# Patient Record
Sex: MALE | Race: WHITE | NOT HISPANIC OR LATINO | Employment: FULL TIME | URBAN - METROPOLITAN AREA
[De-identification: names, ages, dates, MRNs, and addresses within clinical notes are randomized per-mention and may not be internally consistent; named-entity substitution may affect disease eponyms.]

---

## 2017-02-15 ENCOUNTER — GENERIC CONVERSION - ENCOUNTER (OUTPATIENT)
Dept: OTHER | Facility: OTHER | Age: 37
End: 2017-02-15

## 2017-06-19 ENCOUNTER — HOSPITAL ENCOUNTER (OUTPATIENT)
Dept: RADIOLOGY | Age: 37
Discharge: HOME | End: 2017-06-19
Payer: COMMERCIAL

## 2017-06-19 DIAGNOSIS — M54.2 CERVICALGIA: ICD-10-CM

## 2017-06-19 DIAGNOSIS — R68.89: ICD-10-CM

## 2017-06-19 PROCEDURE — A9585 GADOBUTROL INJECTION: HCPCS | Performed by: RADIOLOGY

## 2017-06-19 PROCEDURE — 72156 MRI NECK SPINE W/O & W/DYE: CPT

## 2017-06-19 RX ADMIN — GADOBUTROL 8 ML: 604.72 INJECTION INTRAVENOUS at 16:57

## 2017-06-27 ENCOUNTER — GENERIC CONVERSION - ENCOUNTER (OUTPATIENT)
Dept: OTHER | Facility: OTHER | Age: 37
End: 2017-06-27

## 2017-07-06 ENCOUNTER — ALLSCRIPTS OFFICE VISIT (OUTPATIENT)
Dept: OTHER | Facility: OTHER | Age: 37
End: 2017-07-06

## 2017-07-06 DIAGNOSIS — R05.9 COUGH: ICD-10-CM

## 2017-07-06 DIAGNOSIS — E78.00 PURE HYPERCHOLESTEROLEMIA: ICD-10-CM

## 2018-01-15 VITALS
SYSTOLIC BLOOD PRESSURE: 118 MMHG | OXYGEN SATURATION: 98 % | HEIGHT: 69 IN | RESPIRATION RATE: 18 BRPM | DIASTOLIC BLOOD PRESSURE: 80 MMHG | BODY MASS INDEX: 28 KG/M2 | HEART RATE: 60 BPM | WEIGHT: 189.04 LBS

## 2018-01-15 NOTE — MISCELLANEOUS
Provider Comments  Provider Comments:   Pt was a n/s  LMOM to call back and make new apt        Signatures   Electronically signed by : SEVEN Ford ; Feb 15 2017 10:56AM EST                       (Review)

## 2018-06-19 ENCOUNTER — OFFICE VISIT (OUTPATIENT)
Dept: INTERNAL MEDICINE CLINIC | Facility: CLINIC | Age: 38
End: 2018-06-19
Payer: COMMERCIAL

## 2018-06-19 VITALS
HEART RATE: 60 BPM | OXYGEN SATURATION: 98 % | DIASTOLIC BLOOD PRESSURE: 90 MMHG | SYSTOLIC BLOOD PRESSURE: 110 MMHG | HEIGHT: 69 IN | WEIGHT: 191.2 LBS | BODY MASS INDEX: 28.32 KG/M2 | TEMPERATURE: 98.1 F

## 2018-06-19 DIAGNOSIS — R10.32 ABDOMINAL PAIN, LEFT LOWER QUADRANT: Primary | ICD-10-CM

## 2018-06-19 DIAGNOSIS — R10.9 ACUTE LEFT FLANK PAIN: ICD-10-CM

## 2018-06-19 PROCEDURE — 99214 OFFICE O/P EST MOD 30 MIN: CPT | Performed by: INTERNAL MEDICINE

## 2018-06-19 RX ORDER — ALPRAZOLAM 0.25 MG/1
1 TABLET ORAL 3 TIMES DAILY PRN
COMMUNITY
Start: 2016-08-11 | End: 2018-06-19 | Stop reason: ALTCHOICE

## 2018-06-19 NOTE — ASSESSMENT & PLAN NOTE
With mild tenderness discussed the possibility of early diverticulitis, discussed getting CT of the abdomen pelvis for further evaluation of this

## 2018-06-19 NOTE — ASSESSMENT & PLAN NOTE
With pain radiating around in the left lower quadrant, discussed possibility of kidney stone, will check urinalysis and imaging  Stay hydrated      If all normal, this could just be a muscle strain from playing baseball, and physical therapy recommended for this

## 2018-06-19 NOTE — PATIENT INSTRUCTIONS
Problem List Items Addressed This Visit     Abdominal pain, left lower quadrant - Primary       With mild tenderness discussed the possibility of early diverticulitis, discussed getting CT of the abdomen pelvis for further evaluation of this  Relevant Orders    CT abdomen pelvis w contrast    CBC and differential    Comprehensive metabolic panel    Urinalysis with reflex to microscopic    Acute left flank pain       With pain radiating around in the left lower quadrant, discussed possibility of kidney stone, will check urinalysis and imaging  Stay hydrated      If all normal, this could just be a muscle strain from playing baseball, and physical therapy recommended for this         Relevant Orders    CT abdomen pelvis w contrast    CBC and differential    Comprehensive metabolic panel    Urinalysis with reflex to microscopic

## 2018-06-22 ENCOUNTER — HOSPITAL ENCOUNTER (OUTPATIENT)
Dept: RADIOLOGY | Age: 38
Discharge: HOME | End: 2018-06-22
Payer: COMMERCIAL

## 2018-06-22 DIAGNOSIS — R10.9 ACUTE LEFT FLANK PAIN: ICD-10-CM

## 2018-06-22 DIAGNOSIS — R10.32 ABDOMINAL PAIN, LEFT LOWER QUADRANT: ICD-10-CM

## 2018-06-22 PROCEDURE — 74178 CT ABD&PLV WO CNTR FLWD CNTR: CPT

## 2018-06-22 RX ADMIN — IOHEXOL 100 ML: 350 INJECTION, SOLUTION INTRAVENOUS at 09:23

## 2018-06-26 ENCOUNTER — TELEPHONE (OUTPATIENT)
Dept: INTERNAL MEDICINE CLINIC | Facility: CLINIC | Age: 38
End: 2018-06-26

## 2018-06-26 NOTE — TELEPHONE ENCOUNTER
Patient called back, asked if this result ruled out diverticulitis? I asked Kristi she said there was not mention of diverticulitis  Patient asked if you would review the results as well when you return because he is still feeling symptoms/pain  Patient said he will go to urgent care if they worsen but wanted your opinion

## 2018-07-02 DIAGNOSIS — M54.50 ACUTE LEFT-SIDED LOW BACK PAIN WITHOUT SCIATICA: Primary | ICD-10-CM

## 2018-07-02 NOTE — TELEPHONE ENCOUNTER
I called pt and referred to ortho  Patient complained of ongoing pain in left lower back, with numbness in his left side in left lower abdomen

## 2018-07-20 ENCOUNTER — APPOINTMENT (OUTPATIENT)
Dept: RADIOLOGY | Facility: OTHER | Age: 38
End: 2018-07-20
Payer: COMMERCIAL

## 2018-07-20 ENCOUNTER — OFFICE VISIT (OUTPATIENT)
Dept: OBGYN CLINIC | Facility: OTHER | Age: 38
End: 2018-07-20
Payer: COMMERCIAL

## 2018-07-20 VITALS
BODY MASS INDEX: 28.14 KG/M2 | SYSTOLIC BLOOD PRESSURE: 148 MMHG | HEIGHT: 69 IN | HEART RATE: 60 BPM | WEIGHT: 190 LBS | DIASTOLIC BLOOD PRESSURE: 90 MMHG

## 2018-07-20 DIAGNOSIS — M54.42 ACUTE BILATERAL LOW BACK PAIN WITH LEFT-SIDED SCIATICA: Primary | ICD-10-CM

## 2018-07-20 DIAGNOSIS — M54.42 ACUTE BILATERAL LOW BACK PAIN WITH LEFT-SIDED SCIATICA: ICD-10-CM

## 2018-07-20 DIAGNOSIS — R29.898 WEAKNESS OF FOOT, LEFT: ICD-10-CM

## 2018-07-20 PROCEDURE — 72114 X-RAY EXAM L-S SPINE BENDING: CPT

## 2018-07-20 PROCEDURE — 99204 OFFICE O/P NEW MOD 45 MIN: CPT | Performed by: INTERNAL MEDICINE

## 2018-07-20 RX ORDER — NAPROXEN 500 MG/1
500 TABLET ORAL 2 TIMES DAILY WITH MEALS
Qty: 30 TABLET | Refills: 0 | Status: SHIPPED | OUTPATIENT
Start: 2018-07-20 | End: 2019-01-02 | Stop reason: ALTCHOICE

## 2018-07-20 RX ORDER — PREDNISONE 20 MG/1
20 TABLET ORAL DAILY
Qty: 6 TABLET | Refills: 0 | Status: SHIPPED | OUTPATIENT
Start: 2018-07-20 | End: 2019-01-02 | Stop reason: ALTCHOICE

## 2018-07-20 NOTE — PROGRESS NOTES
Assessment/Plan:  Assessment/Plan   Diagnoses and all orders for this visit:    Acute bilateral low back pain with left-sided sciatica  -     Ambulatory referral to Orthopedic Surgery  -     XR spine lumbar complete w bending minimum 6 views; Future  -     naproxen (NAPROSYN) 500 mg tablet; Take 1 tablet (500 mg total) by mouth 2 (two) times a day with meals  -     predniSONE 20 mg tablet; Take 1 tablet (20 mg total) by mouth daily x5 days then 1/2 tab on day 6 & 7  -     MRI lumbar spine wo contrast; Future    Weakness of foot, left  -     MRI lumbar spine wo contrast; Future    Other orders  -     ibuprofen (MOTRIN) 100 mg/5 mL suspension; Take 200 mg by mouth every 4 (four) hours as needed for mild pain    Lumbosacral x-ray done today is significant for grade 1 anterior spondylolisthesis at L4/L5  Given the presence of his low back pain with neurological symptoms and muscle weakness, I have ordered lumbar MRI for further diagnostic evaluation  He will follow up for re-evaluation once the MRI is completed  Subjective:   Patient ID: Rudy Case is a 30 y o  male  HPI    Mr Lola Samuel is a pleasant 29-year-old active gentleman who presents for initial evaluation of an acute onset low back pain which he developed while playing baseball approximately 1/2 months ago  His pain started insidiously during 1 of his games  He does not remember any specific time point or incident that caused onset of the pain  The pain has gradually worsened   To the point that he has actually stopped plain due to his pain symptoms  Initially, he was taking Aleve 1 tab p o  P r n  For pain control  He  Has also purchased an inversion table which he has been using for symptomatic relief  He reports that his pain although was across his back, it travels more towards his left side  The pain occasionally radiates to the anterior aspect of his  Left lower abdomen, pelvis/ groin area     The numbness radiation into his anterior left abdomen is constant  He also experiences intermittent numbness and tingling down his left leg into his entire toes  His pain is exacerbated by   Standing,prolonged sitting, sitting with his legs crossed, and ambulation  His pain is alleviated somewhat when he is sleeping on his right side  He denies any focal weakness, bowel or bladder incontinence  The following portions of the patient's history were reviewed and updated as appropriate: allergies, current medications, past family history, past medical history, past social history, past surgical history and problem list     Review of Systems  Review of Systems   Constitutional: Negative  HENT: Negative  Eyes: Negative  Respiratory: Negative  Cardiovascular: Negative  Musculoskeletal:        As per history of present illness   Skin: Negative  Neurological:        As per history of present illness   Psychiatric/Behavioral: Negative  Objective:  Vitals:    07/20/18 1326   BP: 148/90   Pulse: 60   Weight: 86 2 kg (190 lb)   Height: 5' 9" (1 753 m)       Back Exam     Tenderness   The patient is experiencing tenderness in the lumbar (L4 through S1 palpable bilateral paralumbar discomfort without overt pain  )  Range of Motion   Back extension: Pain with lumbar extension  Flexion: normal   Back lateral bend left: Painful  Muscle Strength   Back normal muscle strength: 4/5 left hip flexion weakness  Left Great toe extension (EHL) weakness  Tests   Straight leg raise left: positive    Reflexes   Patellar: normal    Other   Erythema: no back redness    Comments:  Positive Stork test             Physical Exam  Constitutional: Oriented to person, place, and time  Well-developed and well-nourished  HENT:   Head: Normocephalic and atraumatic  Eyes: Conjunctivae are normal    Cardiovascular: Normal rate  Pulmonary/Chest: Effort normal    Neurological: Alert and oriented to person, place, and time     Skin: Skin is warm and dry    Psychiatric: Normal mood and affect  I have personally reviewed pertinent films in PACS and my interpretation is Lumbar x-ray done today significant for a grade 1 L4/L5 anterior spondylolisthesis  Patient has a 6 non-rib-bearing vetebrae due to S1 lumbarization  Vernell Concepcion

## 2018-07-25 ENCOUNTER — HOSPITAL ENCOUNTER (OUTPATIENT)
Dept: RADIOLOGY | Age: 38
Discharge: HOME | End: 2018-07-25
Payer: COMMERCIAL

## 2018-07-25 DIAGNOSIS — R29.898 WEAKNESS OF FOOT, LEFT: ICD-10-CM

## 2018-07-25 DIAGNOSIS — M54.42 ACUTE BILATERAL LOW BACK PAIN WITH LEFT-SIDED SCIATICA: ICD-10-CM

## 2018-07-25 PROCEDURE — 72148 MRI LUMBAR SPINE W/O DYE: CPT

## 2018-07-26 ENCOUNTER — TELEPHONE (OUTPATIENT)
Dept: OBGYN CLINIC | Facility: HOSPITAL | Age: 38
End: 2018-07-26

## 2018-07-26 DIAGNOSIS — M43.10 PARS DEFECT WITH SPONDYLOLISTHESIS: Primary | ICD-10-CM

## 2018-07-26 DIAGNOSIS — M48.061 FORAMINAL STENOSIS OF LUMBAR REGION: ICD-10-CM

## 2018-07-26 DIAGNOSIS — M43.00 PARS DEFECT WITH SPONDYLOLISTHESIS: Primary | ICD-10-CM

## 2018-07-26 NOTE — TELEPHONE ENCOUNTER
Caller: patient  Call back number: [phone]  Patient's doctor: Dr Rin Moreno    Patient called asking where he needs to contact to get the back brace fitted   Please advise

## 2018-07-30 ENCOUNTER — OFFICE VISIT (OUTPATIENT)
Dept: OBGYN CLINIC | Facility: HOSPITAL | Age: 38
End: 2018-07-30
Payer: COMMERCIAL

## 2018-07-30 VITALS
HEIGHT: 69 IN | WEIGHT: 192.6 LBS | DIASTOLIC BLOOD PRESSURE: 86 MMHG | BODY MASS INDEX: 28.53 KG/M2 | SYSTOLIC BLOOD PRESSURE: 126 MMHG | HEART RATE: 60 BPM

## 2018-07-30 DIAGNOSIS — M43.00 PARS DEFECT WITH SPONDYLOLISTHESIS: ICD-10-CM

## 2018-07-30 DIAGNOSIS — M43.10 PARS DEFECT WITH SPONDYLOLISTHESIS: ICD-10-CM

## 2018-07-30 DIAGNOSIS — M48.061 FORAMINAL STENOSIS OF LUMBAR REGION: ICD-10-CM

## 2018-07-30 PROBLEM — M51.36 DDD (DEGENERATIVE DISC DISEASE), LUMBAR: Status: ACTIVE | Noted: 2018-07-30

## 2018-07-30 PROBLEM — M51.369 DDD (DEGENERATIVE DISC DISEASE), LUMBAR: Status: ACTIVE | Noted: 2018-07-30

## 2018-07-30 PROCEDURE — 99214 OFFICE O/P EST MOD 30 MIN: CPT | Performed by: ORTHOPAEDIC SURGERY

## 2018-07-30 NOTE — PROGRESS NOTES
Assessment/Plan:      Patient seen and examined by Dr Erin Harris and myself  He has chronic transverse LBP recently aggravated by sporting activities, and now presents with intermittent LE radicular symptoms  MRI lumbar spine reveals grade 2 spondylolisthesis at L4 on L5 causing moderate and severe spinal stenosis  Our recommendation at this time is to initiate physical therapy for core strengthening  If symptoms worsen, he can try injections in the future, however hold off for now given symptoms are mild to moderate  No surgical intervention at this time from our standpoint  He can return to us on a PRN basis, if symptoms worsen without relief from conservative management  Problem List Items Addressed This Visit     Pars defect with spondylolisthesis    Foraminal stenosis of lumbar region            Subjective:      Patient ID: Krista Anderson is a 41 y o  male  HPI     The patient is a 42-year-old male who presents for initial evaluation with Dr Erin Harris  He states that he has had several years of chronic aching low back pain however has recently been intensified by golf and baseball  He states the pain is transverse lower back dull aching and has recently got an sharp shooting pains to bilateral lower extremities left greater than right  He also has occasional left foot numbness however this is rare  He denies any bowel or bladder incontinence no specific injury no history of surgery on his lower back  He has not done physical therapy or injections for this problem  He is referred by Sports Medicine team due to his MRI of the lumbar spine  The following portions of the patient's history were reviewed and updated as appropriate: allergies, current medications, past family history, past medical history, past social history, past surgical history and problem list     Review of Systems   Constitutional: Negative for chills, diaphoresis, fatigue and fever     Respiratory: Negative for shortness of breath and wheezing  Cardiovascular: Negative for chest pain, palpitations and leg swelling  Genitourinary: Negative for decreased urine volume and difficulty urinating  Musculoskeletal: Positive for arthralgias and back pain  Negative for gait problem  Skin: Negative for color change, pallor, rash and wound  Neurological: Positive for numbness  Negative for weakness  Objective:      /86   Pulse 60   Ht 5' 9" (1 753 m)   Wt 87 4 kg (192 lb 9 6 oz)   BMI 28 44 kg/m²          Physical Exam   Constitutional: He is oriented to person, place, and time  He appears well-developed and well-nourished  No distress  HENT:   Head: Normocephalic and atraumatic  Pulmonary/Chest: Effort normal    Abdominal: Soft  Musculoskeletal: He exhibits tenderness  He exhibits no edema  Neurological: He is alert and oriented to person, place, and time  Skin: Skin is warm and dry  He is not diaphoretic  Psychiatric: He has a normal mood and affect  Nursing note and vitals reviewed  No acute distress  Gait is normal   Inspection reveals no open wounds or erythema  Lumbosacral region is mildly tender to palpation  Negative modified straight leg raise bilaterally negative ABE  Strength is 4/5 with left great toe dorsiflexion otherwise 5/5 L2 through S1 bilaterally  Sensation is equal and intact reflexes are normal at L4 and S1 symmetrically  There is no calf tenderness or pitting edema  Palpable posterior tibialis pulses bilaterally

## 2019-01-02 ENCOUNTER — OFFICE VISIT (OUTPATIENT)
Dept: INTERNAL MEDICINE CLINIC | Facility: CLINIC | Age: 39
End: 2019-01-02
Payer: COMMERCIAL

## 2019-01-02 VITALS
SYSTOLIC BLOOD PRESSURE: 120 MMHG | OXYGEN SATURATION: 99 % | DIASTOLIC BLOOD PRESSURE: 88 MMHG | BODY MASS INDEX: 29.18 KG/M2 | WEIGHT: 197 LBS | HEART RATE: 66 BPM | TEMPERATURE: 98.4 F | HEIGHT: 69 IN

## 2019-01-02 DIAGNOSIS — Z13.220 SCREENING FOR HYPERCHOLESTEROLEMIA: ICD-10-CM

## 2019-01-02 DIAGNOSIS — Z13.29 SCREENING FOR THYROID DISORDER: ICD-10-CM

## 2019-01-02 DIAGNOSIS — Z00.00 ENCOUNTER FOR WELLNESS EXAMINATION: Primary | ICD-10-CM

## 2019-01-02 DIAGNOSIS — F41.9 ANXIETY: ICD-10-CM

## 2019-01-02 PROCEDURE — 99395 PREV VISIT EST AGE 18-39: CPT | Performed by: INTERNAL MEDICINE

## 2019-01-02 RX ORDER — ESCITALOPRAM OXALATE 10 MG/1
10 TABLET ORAL DAILY
Qty: 30 TABLET | Refills: 5 | Status: SHIPPED | OUTPATIENT
Start: 2019-01-02 | End: 2019-07-14 | Stop reason: SDUPTHER

## 2019-01-02 NOTE — ASSESSMENT & PLAN NOTE
Will try escitalopram, and discussed with patient that after approximately 6 months panel how he is feeling he could try titrating off, or continuing

## 2019-01-02 NOTE — ASSESSMENT & PLAN NOTE
Discussed preventative health, cancer screening, immunizations, and safety issues    Blood pressure was slightly elevated on arrival, but then came down to normal

## 2019-01-02 NOTE — PROGRESS NOTES
Assessment/Plan:    Anxiety  Will try escitalopram, and discussed with patient that after approximately 6 months panel how he is feeling he could try titrating off, or continuing  Encounter for wellness examination  Discussed preventative health, cancer screening, immunizations, and safety issues  Blood pressure was slightly elevated on arrival, but then came down to normal        Diagnoses and all orders for this visit:    Encounter for wellness examination    Anxiety  -     escitalopram (LEXAPRO) 10 mg tablet; Take 1 tablet (10 mg total) by mouth daily    Screening for thyroid disorder  -     TSH, 3rd generation with Free T4 reflex; Future    Screening for hypercholesterolemia  -     Lipid Panel with Direct LDL reflex; Future    Other orders  -     Cancel: TDAP VACCINE GREATER THAN OR EQUAL TO 8YO IM          Subjective:      Patient ID: Issac Tyler is a 41 y o  male  Wellness:  Patient sees a dentist regularly, no smoking cigarettes, he has been eating is healthfully or exercising as much due to having two  twins at home  Patient having some anxiety lately, with feelings of being overwhelmed  He does have a history of this was treated with escitalopram in the past, about 2 years ago  The following portions of the patient's history were reviewed and updated as appropriate: allergies, current medications, past family history, past medical history, past social history, past surgical history and problem list     Review of Systems   Constitutional: Negative for chills, fatigue and fever  HENT: Negative for congestion, nosebleeds, postnasal drip, sore throat and trouble swallowing  Eyes: Negative for pain  Respiratory: Negative for cough, chest tightness, shortness of breath and wheezing  Cardiovascular: Negative for chest pain, palpitations and leg swelling  Gastrointestinal: Negative for abdominal pain, constipation, diarrhea, nausea and vomiting     Endocrine: Negative for polydipsia and polyuria  Genitourinary: Negative for dysuria, flank pain and hematuria  Musculoskeletal: Negative for arthralgias  Skin: Negative for rash  Neurological: Negative for dizziness, tremors and headaches  Hematological: Does not bruise/bleed easily  Psychiatric/Behavioral: Negative for confusion and dysphoric mood  The patient is nervous/anxious  Objective:      /88   Pulse 66   Temp 98 4 °F (36 9 °C)   Ht 5' 9" (1 753 m)   Wt 89 4 kg (197 lb)   SpO2 99%   BMI 29 09 kg/m²          Physical Exam   Constitutional: He is oriented to person, place, and time  He appears well-developed and well-nourished  No distress  HENT:   Head: Normocephalic and atraumatic  Right Ear: External ear normal    Left Ear: External ear normal    Eyes: Conjunctivae are normal  No scleral icterus  Neck: Normal range of motion  Neck supple  No tracheal deviation present  No thyromegaly present  Cardiovascular: Normal rate, regular rhythm and normal heart sounds  Pulmonary/Chest: Effort normal and breath sounds normal  No respiratory distress  He has no wheezes  He has no rales  Abdominal: Soft  Bowel sounds are normal  There is no tenderness  There is no rebound and no guarding  Hernia confirmed negative in the right inguinal area and confirmed negative in the left inguinal area  Genitourinary: Penis normal  Right testis shows no mass and no tenderness  Left testis shows no mass and no tenderness  Musculoskeletal: He exhibits no edema  Lymphadenopathy:     He has no cervical adenopathy  Neurological: He is alert and oriented to person, place, and time  Psychiatric: He has a normal mood and affect  His behavior is normal  Judgment and thought content normal    Vitals reviewed

## 2019-01-02 NOTE — PATIENT INSTRUCTIONS
Problem List Items Addressed This Visit        Other    Anxiety     Will try escitalopram, and discussed with patient that after approximately 6 months panel how he is feeling he could try titrating off, or continuing  Relevant Medications    escitalopram (LEXAPRO) 10 mg tablet    Encounter for wellness examination - Primary     Discussed preventative health, cancer screening, immunizations, and safety issues    Blood pressure was slightly elevated on arrival, but then came down to normal            Other Visit Diagnoses     Screening for thyroid disorder        Relevant Orders    TSH, 3rd generation with Free T4 reflex    Screening for hypercholesterolemia        Relevant Orders    Lipid Panel with Direct LDL reflex

## 2019-07-14 DIAGNOSIS — F41.9 ANXIETY: ICD-10-CM

## 2019-07-15 RX ORDER — ESCITALOPRAM OXALATE 10 MG/1
TABLET ORAL
Qty: 30 TABLET | Refills: 5 | Status: SHIPPED | OUTPATIENT
Start: 2019-07-15 | End: 2020-02-06

## 2020-01-05 ENCOUNTER — TELEPHONE (OUTPATIENT)
Dept: OTHER | Facility: OTHER | Age: 40
End: 2020-01-05

## 2020-02-06 DIAGNOSIS — F41.9 ANXIETY: ICD-10-CM

## 2020-02-06 RX ORDER — ESCITALOPRAM OXALATE 10 MG/1
TABLET ORAL
Qty: 30 TABLET | Refills: 0 | Status: SHIPPED | OUTPATIENT
Start: 2020-02-06 | End: 2020-02-14 | Stop reason: SDUPTHER

## 2020-02-14 ENCOUNTER — OFFICE VISIT (OUTPATIENT)
Dept: INTERNAL MEDICINE CLINIC | Facility: CLINIC | Age: 40
End: 2020-02-14
Payer: COMMERCIAL

## 2020-02-14 VITALS
DIASTOLIC BLOOD PRESSURE: 80 MMHG | HEIGHT: 69 IN | TEMPERATURE: 98.6 F | OXYGEN SATURATION: 98 % | HEART RATE: 64 BPM | WEIGHT: 197.8 LBS | SYSTOLIC BLOOD PRESSURE: 120 MMHG | BODY MASS INDEX: 29.3 KG/M2

## 2020-02-14 DIAGNOSIS — Z00.00 ENCOUNTER FOR WELLNESS EXAMINATION: Primary | ICD-10-CM

## 2020-02-14 DIAGNOSIS — F41.9 ANXIETY: ICD-10-CM

## 2020-02-14 DIAGNOSIS — E78.00 PURE HYPERCHOLESTEROLEMIA: ICD-10-CM

## 2020-02-14 PROCEDURE — 3008F BODY MASS INDEX DOCD: CPT | Performed by: INTERNAL MEDICINE

## 2020-02-14 PROCEDURE — 99395 PREV VISIT EST AGE 18-39: CPT | Performed by: INTERNAL MEDICINE

## 2020-02-14 RX ORDER — ESCITALOPRAM OXALATE 10 MG/1
10 TABLET ORAL DAILY
Qty: 30 TABLET | Refills: 5 | Status: SHIPPED | OUTPATIENT
Start: 2020-02-14 | End: 2020-09-21

## 2020-02-14 NOTE — ASSESSMENT & PLAN NOTE
Discussed preventative health, cancer screening, immunizations, and safety issues  Patient is up-to-date with flu shot    Patient had the tetanus shot a couple of years ago before his twin boys were born

## 2020-02-14 NOTE — PATIENT INSTRUCTIONS
Problem List Items Addressed This Visit        Other    Anxiety     Patient doing well on medication  Discussed with patient that if he is in a good place where he feels he does not need the medication any more, he can titrate off slowly and see how he does off the medication  If patient were to have a significant return of anxiety symptoms, and can go back on Lexapro         Encounter for wellness examination - Primary     Discussed preventative health, cancer screening, immunizations, and safety issues  Patient is up-to-date with flu shot  Patient had the tetanus shot a couple of years ago before his twin boys were born         Pure hypercholesterolemia     Will recheck, discussed healthy diet and exercise    Discussed that a more plant based diet will help improve cholesterol         Relevant Orders    CBC and differential    Comprehensive metabolic panel    Lipid Panel with Direct LDL reflex    TSH, 3rd generation with Free T4 reflex

## 2020-02-14 NOTE — ASSESSMENT & PLAN NOTE
Will recheck, discussed healthy diet and exercise    Discussed that a more plant based diet will help improve cholesterol

## 2020-02-14 NOTE — PROGRESS NOTES
Assessment/Plan:    Encounter for wellness examination  Discussed preventative health, cancer screening, immunizations, and safety issues  Patient is up-to-date with flu shot  Patient had the tetanus shot a couple of years ago before his twin boys were born    Pure hypercholesterolemia  Will recheck, discussed healthy diet and exercise  Discussed that a more plant based diet will help improve cholesterol    Anxiety  Patient doing well on medication  Discussed with patient that if he is in a good place where he feels he does not need the medication any more, he can titrate off slowly and see how he does off the medication  If patient were to have a significant return of anxiety symptoms, and can go back on Lexapro       Diagnoses and all orders for this visit:    Encounter for wellness examination    Pure hypercholesterolemia  -     CBC and differential; Future  -     Comprehensive metabolic panel; Future  -     Lipid Panel with Direct LDL reflex; Future  -     TSH, 3rd generation with Free T4 reflex; Future    Anxiety  -     escitalopram (LEXAPRO) 10 mg tablet; Take 1 tablet (10 mg total) by mouth daily    Other orders  -     Cancel: TDAP VACCINE GREATER THAN OR EQUAL TO 8YO IM        BMI Counseling: Body mass index is 29 21 kg/m²  The BMI is above normal  Nutrition recommendations include encouraging healthy choices of fruits and vegetables and moderation in carbohydrate intake  Exercise recommendations include exercising 3-5 times per week  Subjective:      Patient ID: Victor Manuel Day is a 42 y o  male  Wellness:  Patient sees a dentist regularly no smoking cigarettes, exercising more, eating healthy diet    Anxiety:  Patient has been doing well on Lexapro, he has been on it about a year        The following portions of the patient's history were reviewed and updated as appropriate: allergies, current medications, past family history, past medical history, past social history, past surgical history and problem list     Review of Systems   Constitutional: Negative for chills, fatigue and fever  HENT: Negative for congestion, nosebleeds, postnasal drip, sore throat and trouble swallowing  Eyes: Negative for pain  Respiratory: Negative for cough, chest tightness, shortness of breath and wheezing  Cardiovascular: Negative for chest pain, palpitations and leg swelling  Gastrointestinal: Negative for abdominal pain, constipation, diarrhea, nausea and vomiting  Endocrine: Negative for polydipsia and polyuria  Genitourinary: Negative for dysuria, flank pain and hematuria  Musculoskeletal: Negative for arthralgias  Skin: Negative for rash  Neurological: Negative for dizziness, tremors, light-headedness and headaches  Hematological: Does not bruise/bleed easily  Psychiatric/Behavioral: Negative for confusion and dysphoric mood  The patient is not nervous/anxious  Objective:      /80   Pulse 64   Temp 98 6 °F (37 °C)   Ht 5' 9" (1 753 m)   Wt 89 7 kg (197 lb 12 8 oz)   SpO2 98%   BMI 29 21 kg/m²          Physical Exam   Constitutional: He is oriented to person, place, and time  He appears well-developed and well-nourished  No distress  HENT:   Head: Normocephalic and atraumatic  Right Ear: External ear normal    Left Ear: External ear normal    Eyes: Conjunctivae are normal  No scleral icterus  Neck: Normal range of motion  Neck supple  No tracheal deviation present  No thyromegaly present  Cardiovascular: Normal rate, regular rhythm and normal heart sounds  No murmur heard  Pulmonary/Chest: Effort normal and breath sounds normal  No respiratory distress  He has no wheezes  He has no rales  Abdominal: Soft  Bowel sounds are normal  There is no tenderness  There is no rebound and no guarding  Hernia confirmed negative in the right inguinal area and confirmed negative in the left inguinal area  Genitourinary: Right testis shows no mass and no tenderness  Left testis shows no mass and no tenderness  Musculoskeletal: He exhibits no edema  Lymphadenopathy:     He has no cervical adenopathy  Neurological: He is alert and oriented to person, place, and time  Psychiatric: He has a normal mood and affect  His behavior is normal  Judgment and thought content normal    Vitals reviewed

## 2020-09-19 DIAGNOSIS — F41.9 ANXIETY: ICD-10-CM

## 2020-09-21 RX ORDER — ESCITALOPRAM OXALATE 10 MG/1
TABLET ORAL
Qty: 90 TABLET | Refills: 3 | Status: SHIPPED | OUTPATIENT
Start: 2020-09-21 | End: 2021-10-08

## 2021-01-11 ENCOUNTER — TELEPHONE (OUTPATIENT)
Dept: INTERNAL MEDICINE CLINIC | Facility: CLINIC | Age: 41
End: 2021-01-11

## 2021-01-11 DIAGNOSIS — R51.9 NONINTRACTABLE HEADACHE, UNSPECIFIED CHRONICITY PATTERN, UNSPECIFIED HEADACHE TYPE: Primary | ICD-10-CM

## 2021-01-11 DIAGNOSIS — R51.9 NONINTRACTABLE HEADACHE, UNSPECIFIED CHRONICITY PATTERN, UNSPECIFIED HEADACHE TYPE: ICD-10-CM

## 2021-01-11 PROCEDURE — U0003 INFECTIOUS AGENT DETECTION BY NUCLEIC ACID (DNA OR RNA); SEVERE ACUTE RESPIRATORY SYNDROME CORONAVIRUS 2 (SARS-COV-2) (CORONAVIRUS DISEASE [COVID-19]), AMPLIFIED PROBE TECHNIQUE, MAKING USE OF HIGH THROUGHPUT TECHNOLOGIES AS DESCRIBED BY CMS-2020-01-R: HCPCS | Performed by: INTERNAL MEDICINE

## 2021-01-11 PROCEDURE — U0005 INFEC AGEN DETEC AMPLI PROBE: HCPCS | Performed by: INTERNAL MEDICINE

## 2021-01-11 NOTE — TELEPHONE ENCOUNTER
Patient was contact 10 min ago that he was with a (+) covid person at work  He was in contact with them on Thursday for about 20 mins, they were farther than 6 feet but he was touching brick samples that the (+) person had touched  Patient is having symptoms: ache, sore muscles and HA x 3 days

## 2021-01-11 NOTE — TELEPHONE ENCOUNTER
Call patient, order for COVID testing entered, he can go for it tomorrow if he can not get to a location before they close today  He should quarantine for 10 days from his onset of symptoms, and monitor for any shortness of breath

## 2021-01-12 LAB — SARS-COV-2 RNA SPEC QL NAA+PROBE: NOT DETECTED

## 2021-03-02 ENCOUNTER — OFFICE VISIT (OUTPATIENT)
Dept: INTERNAL MEDICINE CLINIC | Facility: CLINIC | Age: 41
End: 2021-03-02
Payer: COMMERCIAL

## 2021-03-02 VITALS
DIASTOLIC BLOOD PRESSURE: 62 MMHG | HEART RATE: 82 BPM | BODY MASS INDEX: 30.04 KG/M2 | SYSTOLIC BLOOD PRESSURE: 110 MMHG | WEIGHT: 202.8 LBS | TEMPERATURE: 96.5 F | HEIGHT: 69 IN | OXYGEN SATURATION: 98 %

## 2021-03-02 DIAGNOSIS — Z13.0 ENCOUNTER FOR SCREENING FOR DISEASES OF THE BLOOD AND BLOOD-FORMING ORGANS AND CERTAIN DISORDERS INVOLVING THE IMMUNE MECHANISM: Primary | ICD-10-CM

## 2021-03-02 DIAGNOSIS — Z00.00 ENCOUNTER FOR WELLNESS EXAMINATION: ICD-10-CM

## 2021-03-02 DIAGNOSIS — Z13.220 SCREENING FOR HYPERCHOLESTEROLEMIA: ICD-10-CM

## 2021-03-02 DIAGNOSIS — Z23 NEEDS FLU SHOT: ICD-10-CM

## 2021-03-02 DIAGNOSIS — Z23 ENCOUNTER FOR IMMUNIZATION: ICD-10-CM

## 2021-03-02 DIAGNOSIS — Z13.1 SCREENING FOR DIABETES MELLITUS: ICD-10-CM

## 2021-03-02 DIAGNOSIS — E55.9 VITAMIN D DEFICIENCY: ICD-10-CM

## 2021-03-02 DIAGNOSIS — Z13.29 SCREENING FOR THYROID DISORDER: ICD-10-CM

## 2021-03-02 DIAGNOSIS — Z11.4 SCREENING FOR HIV (HUMAN IMMUNODEFICIENCY VIRUS): ICD-10-CM

## 2021-03-02 DIAGNOSIS — F41.9 ANXIETY: ICD-10-CM

## 2021-03-02 PROCEDURE — 1036F TOBACCO NON-USER: CPT | Performed by: INTERNAL MEDICINE

## 2021-03-02 PROCEDURE — 90471 IMMUNIZATION ADMIN: CPT

## 2021-03-02 PROCEDURE — 99395 PREV VISIT EST AGE 18-39: CPT | Performed by: INTERNAL MEDICINE

## 2021-03-02 PROCEDURE — 3725F SCREEN DEPRESSION PERFORMED: CPT | Performed by: INTERNAL MEDICINE

## 2021-03-02 PROCEDURE — 90686 IIV4 VACC NO PRSV 0.5 ML IM: CPT

## 2021-03-02 NOTE — PROGRESS NOTES
Assessment/Plan:    Encounter for wellness examination   Discussed preventative health, cancer screening, immunizations, and safety issues  I recommend flu shot today, patient reports he had a Tdap vaccination within the past 10 years  Discussed high cholesterol in the past, and the algorithm for estimated 10 year risk of cardiovascular disease for which he is too young if it in the algorithm, but I can put his numbers in as if he were 42 to see what his risk would be once he gets his repeat labs  Anxiety   Continue escitalopram       Diagnoses and all orders for this visit:    Encounter for screening for diseases of the blood and blood-forming organs and certain disorders involving the immune mechanism  -     CBC and differential; Future    Screening for HIV (human immunodeficiency virus)    Encounter for immunization    Screening for diabetes mellitus  -     Comprehensive metabolic panel; Future    Screening for hypercholesterolemia  -     Lipid Panel with Direct LDL reflex; Future    Screening for thyroid disorder  -     TSH, 3rd generation with Free T4 reflex; Future    Vitamin D deficiency  -     Vitamin D 25 hydroxy; Future    Needs flu shot  -     influenza vaccine, quadrivalent, 0 5 mL, preservative-free, for adult and pediatric patients 6 mos+ (AFLURIA, FLUARIX, FLULAVAL, FLUZONE)    Encounter for wellness examination    Anxiety    Other orders  -     Cancel: HIV 1/2 Antigen/Antibody (4th Generation) w Reflex [facility]; Future  -     Cancel: TDAP VACCINE GREATER THAN OR EQUAL TO 8YO IM          Subjective:      Patient ID: Montez Street is a 47 y o  male      Wellness:  Patient is healthy diet, exercises, no smoking cigarettes, sees the dentist regularly      The following portions of the patient's history were reviewed and updated as appropriate: allergies, current medications, past family history, past medical history, past social history, past surgical history and problem list     Review of Systems Constitutional: Negative for chills, fatigue and fever  HENT: Negative for congestion, nosebleeds, postnasal drip, sore throat and trouble swallowing  Eyes: Negative for pain  Respiratory: Negative for cough, chest tightness, shortness of breath and wheezing  Cardiovascular: Negative for chest pain, palpitations and leg swelling  Gastrointestinal: Negative for abdominal pain, constipation, diarrhea, nausea and vomiting  Endocrine: Negative for polydipsia and polyuria  Genitourinary: Negative for dysuria, flank pain and hematuria  Musculoskeletal: Negative for arthralgias  Skin: Negative for rash  Neurological: Negative for dizziness, tremors, light-headedness and headaches  Hematological: Does not bruise/bleed easily  Psychiatric/Behavioral: Negative for confusion and dysphoric mood  The patient is not nervous/anxious  Objective:      /62   Pulse 82   Temp (!) 96 5 °F (35 8 °C)   Ht 5' 9" (1 753 m)   Wt 92 kg (202 lb 12 8 oz)   SpO2 98%   BMI 29 95 kg/m²          Physical Exam  Vitals signs reviewed  Constitutional:       General: He is not in acute distress  Appearance: Normal appearance  He is well-developed  HENT:      Head: Normocephalic and atraumatic  Right Ear: External ear normal       Left Ear: External ear normal    Eyes:      General: No scleral icterus  Conjunctiva/sclera: Conjunctivae normal    Neck:      Musculoskeletal: Normal range of motion and neck supple  Thyroid: No thyromegaly  Trachea: No tracheal deviation  Cardiovascular:      Rate and Rhythm: Normal rate and regular rhythm  Heart sounds: Normal heart sounds  No murmur  Pulmonary:      Effort: Pulmonary effort is normal  No respiratory distress  Breath sounds: Normal breath sounds  No wheezing or rales  Abdominal:      General: Bowel sounds are normal       Palpations: Abdomen is soft  Tenderness: There is no abdominal tenderness   There is no guarding or rebound  Hernia: There is no hernia in the left inguinal area or right inguinal area  Genitourinary:     Scrotum/Testes: Normal    Musculoskeletal:      Right lower leg: No edema  Left lower leg: No edema  Lymphadenopathy:      Cervical: No cervical adenopathy  Neurological:      General: No focal deficit present  Mental Status: He is alert and oriented to person, place, and time  Psychiatric:         Mood and Affect: Mood normal          Behavior: Behavior normal          Thought Content:  Thought content normal          Judgment: Judgment normal

## 2021-03-02 NOTE — ASSESSMENT & PLAN NOTE
Discussed preventative health, cancer screening, immunizations, and safety issues  I recommend flu shot today, patient reports he had a Tdap vaccination within the past 10 years  Discussed high cholesterol in the past, and the algorithm for estimated 10 year risk of cardiovascular disease for which he is too young if it in the algorithm, but I can put his numbers in as if he were 42 to see what his risk would be once he gets his repeat labs

## 2021-03-02 NOTE — PATIENT INSTRUCTIONS
Problem List Items Addressed This Visit        Other    Anxiety      Continue escitalopram         Encounter for wellness examination      Discussed preventative health, cancer screening, immunizations, and safety issues  I recommend flu shot today, patient reports he had a Tdap vaccination within the past 10 years  Discussed high cholesterol in the past, and the algorithm for estimated 10 year risk of cardiovascular disease for which he is too young if it in the algorithm, but I can put his numbers in as if he were 42 to see what his risk would be once he gets his repeat labs             Other Visit Diagnoses     Encounter for screening for diseases of the blood and blood-forming organs and certain disorders involving the immune mechanism    -  Primary    Relevant Orders    CBC and differential    Screening for HIV (human immunodeficiency virus)        Encounter for immunization        Screening for diabetes mellitus        Relevant Orders    Comprehensive metabolic panel    Screening for hypercholesterolemia        Relevant Orders    Lipid Panel with Direct LDL reflex    Screening for thyroid disorder        Relevant Orders    TSH, 3rd generation with Free T4 reflex    Vitamin D deficiency        Relevant Orders    Vitamin D 25 hydroxy    Needs flu shot        Relevant Orders    influenza vaccine, quadrivalent, 0 5 mL, preservative-free, for adult and pediatric patients 6 mos+ (AFLURIA, FLUARIX, FLULAVAL, FLUZONE)

## 2021-04-13 ENCOUNTER — TELEMEDICINE (OUTPATIENT)
Dept: INTERNAL MEDICINE CLINIC | Facility: CLINIC | Age: 41
End: 2021-04-13
Payer: COMMERCIAL

## 2021-04-13 DIAGNOSIS — R50.9 FEVER, UNSPECIFIED FEVER CAUSE: Primary | ICD-10-CM

## 2021-04-13 DIAGNOSIS — R50.9 FEVER, UNSPECIFIED FEVER CAUSE: ICD-10-CM

## 2021-04-13 PROCEDURE — U0005 INFEC AGEN DETEC AMPLI PROBE: HCPCS | Performed by: INTERNAL MEDICINE

## 2021-04-13 PROCEDURE — 1036F TOBACCO NON-USER: CPT | Performed by: INTERNAL MEDICINE

## 2021-04-13 PROCEDURE — U0003 INFECTIOUS AGENT DETECTION BY NUCLEIC ACID (DNA OR RNA); SEVERE ACUTE RESPIRATORY SYNDROME CORONAVIRUS 2 (SARS-COV-2) (CORONAVIRUS DISEASE [COVID-19]), AMPLIFIED PROBE TECHNIQUE, MAKING USE OF HIGH THROUGHPUT TECHNOLOGIES AS DESCRIBED BY CMS-2020-01-R: HCPCS | Performed by: INTERNAL MEDICINE

## 2021-04-13 PROCEDURE — 99213 OFFICE O/P EST LOW 20 MIN: CPT | Performed by: INTERNAL MEDICINE

## 2021-04-13 NOTE — PATIENT INSTRUCTIONS
Problem List Items Addressed This Visit        Other    Fever - Primary      With patient's symptoms, I recommend checking for COVID  Patient instructed to quarantine for 10 days from the onset of his symptoms unless testing comes back negative  Patient instructed to reach out if he were to develop any shortness of breath or shortness of breath with exertion    Patient referred to this [facility] web site for other details and frequently ask questions         Relevant Orders    Novel Coronavirus (Covid-19),PCR [facility] - Collected at   [facility]  or [facility]

## 2021-04-13 NOTE — PROGRESS NOTES
COVID-19 Outpatient Progress Note    Assessment/Plan:    Problem List Items Addressed This Visit        Other    Fever - Primary      With patient's symptoms, I recommend checking for COVID  Patient instructed to quarantine for 10 days from the onset of his symptoms unless testing comes back negative  Patient instructed to reach out if he were to develop any shortness of breath or shortness of breath with exertion  Patient referred to this [facility] web site for other details and frequently ask questions         Relevant Orders    Novel Coronavirus (Covid-19),PCR [facility] - Col[facility] at [facility] or [facility]       BMI Counseling: There is no height or weight on file to calculate BMI  The BMI is above normal  Nutrition recommendations include encouraging healthy choices of fruits and vegetables and moderation in carbohydrate intake  Exercise recommendations include exercising 3-5 times per week  Disposition:     I have spent 20 minutes directly with the patient  Encounter provider Silvia Alves MD    Provider located at [address] [place] [id number]    Recent Visits  No visits were found meeting these conditions  Showing recent visits within past 7 days and meeting all other requirements     Today's Visits  Date Type Provider Dept   04/13/21 Telemedicine [facility] today's visits and meeting all other requirements     Future Appointments  No visits were found meeting these conditions  Showing future appointments within next 150 days and meeting all other requirements      This virtual check-in was done via [employer] and patient was informed that this is a secure, HIPAA-compliant platform  He agrees to proceed  Patient agrees to participate in a virtual check in via telephone or video visit instead of presenting to the office to address urgent/immediate medical needs   Patient is aware this is a billable service  After connecting through [facility], the patient was identified by name and date of birth  Nicole Salvador was informed that this was a telemedicine visit and that the exam was being conducted confidentially over secure lines  My office door was closed  No one else was in the room  Nicole Salvador acknowledged consent and understanding of privacy and security of the telemedicine visit  I informed the patient that I have reviewed his record in Epic and presented the opportunity for him to ask any questions regarding the visit today  The patient agreed to participate  Subjective:   Nicole Salvador is a 47 y o  male who is concerned about COVID-19  Patient's symptoms include chills, fatigue, malaise, nasal congestion, cough, nausea, diarrhea, myalgias and headache  Patient denies fever, rhinorrhea, sore throat, anosmia, loss of taste, shortness of breath, chest tightness, abdominal pain and vomiting  Date of symptom onset: 4/10/2021    Lab Results   Component Value Date    SARSCOV2 Not Detected 01/11/2021     Past Medical History:   Diagnosis Date    Basal cell carcinoma (BCC) of scalp or skin of neck     Cancer ([facility] [address] )     DDD (degenerative disc disease), lumbar 7/30/2018     Past Surgical History:   Procedure Laterality Date    NO PAST SURGERIES       Current Outpatient Medications   Medication Sig Dispense Refill    escitalopram (LEXAPRO) 10 mg tablet TAKE 1 TABLET BY MOUTH EVERY DAY 90 tablet 3     No current facility-administered medications for this visit  No Known Allergies    Review of Systems   Constitutional: Positive for chills and fatigue  Negative for fever  HENT: Positive for congestion  Negative for rhinorrhea and sore throat  Respiratory: Positive for cough  Negative for chest tightness and shortness of breath  Gastrointestinal: Positive for diarrhea and nausea  Negative for abdominal pain and vomiting  Musculoskeletal: Positive for myalgias  Neurological: Positive for headaches  Objective: There were no vitals filed for this visit  Physical Exam  Vitals signs reviewed  Constitutional:       General: He is not in acute distress  Appearance: Normal appearance  He is well-developed  He is not diaphoretic  HENT:      Head: Normocephalic and atraumatic  Right Ear: External ear normal       Left Ear: External ear normal       Nose: Nose normal    Eyes:      General: No scleral icterus  Right eye: No discharge  Left eye: No discharge  Conjunctiva/sclera: Conjunctivae normal    Neck:      Musculoskeletal: Normal range of motion and neck supple  Trachea: No tracheal deviation  Pulmonary:      Effort: Pulmonary effort is normal  No respiratory distress  Abdominal:      Palpations: Abdomen is soft  Tenderness: There is no abdominal tenderness  Musculoskeletal: Normal range of motion  Skin:     Coloration: Skin is not pale  Findings: No erythema  Neurological:      Mental Status: He is alert and oriented to person, place, and time  Cranial Nerves: No cranial nerve deficit  Coordination: Coordination normal    Psychiatric:         Behavior: Behavior normal          Thought Content: Thought content normal          Judgment: Judgment normal        VIRTUAL VISIT DISCLAIMER    Paige Schwab acknowledges that he has consented to an online visit or consultation  He understands that the online visit is based solely on information provided by him, and that, in the absence of a face-to-face physical evaluation by the physician, the diagnosis he receives is both limited and provisional in terms of accuracy and completeness  This is not intended to replace a full medical face-to-face evaluation by the physician  Paige Schwab understands and accepts these terms  Yes

## 2021-04-13 NOTE — ASSESSMENT & PLAN NOTE
With patient's symptoms, I recommend checking for COVID  Patient instructed to quarantine for 10 days from the onset of his symptoms unless testing comes back negative  Patient instructed to reach out if he were to develop any shortness of breath or shortness of breath with exertion    Patient referred to this CDC web site for other details and frequently ask questions

## 2021-04-14 LAB — SARS-COV-2 RNA RESP QL NAA+PROBE: POSITIVE

## 2021-10-08 DIAGNOSIS — F41.9 ANXIETY: ICD-10-CM

## 2021-10-08 RX ORDER — ESCITALOPRAM OXALATE 10 MG/1
TABLET ORAL
Qty: 90 TABLET | Refills: 3 | Status: SHIPPED | OUTPATIENT
Start: 2021-10-08

## 2022-02-25 ENCOUNTER — RA CDI HCC (OUTPATIENT)
Dept: OTHER | Facility: HOSPITAL | Age: 42
End: 2022-02-25

## 2022-02-25 NOTE — PROGRESS NOTES
[facility]  coding opportunities       Chart reviewed, no opportunity found: CHART REVIEWED, NO OPPORTUNITY FOUND                        Patients insurance company: [employer] (Medicare Advantage and Commercial)

## 2022-03-03 ENCOUNTER — OFFICE VISIT (OUTPATIENT)
Dept: INTERNAL MEDICINE CLINIC | Facility: CLINIC | Age: 42
End: 2022-03-03
Payer: COMMERCIAL

## 2022-03-03 VITALS
RESPIRATION RATE: 16 BRPM | BODY MASS INDEX: 31.67 KG/M2 | OXYGEN SATURATION: 97 % | SYSTOLIC BLOOD PRESSURE: 124 MMHG | HEIGHT: 69 IN | HEART RATE: 62 BPM | WEIGHT: 213.8 LBS | DIASTOLIC BLOOD PRESSURE: 74 MMHG

## 2022-03-03 DIAGNOSIS — Z11.4 SCREENING FOR HIV (HUMAN IMMUNODEFICIENCY VIRUS): ICD-10-CM

## 2022-03-03 DIAGNOSIS — Z23 ENCOUNTER FOR IMMUNIZATION: ICD-10-CM

## 2022-03-03 DIAGNOSIS — E55.9 VITAMIN D DEFICIENCY: ICD-10-CM

## 2022-03-03 DIAGNOSIS — E78.00 PURE HYPERCHOLESTEROLEMIA: Primary | ICD-10-CM

## 2022-03-03 DIAGNOSIS — Z00.00 ENCOUNTER FOR WELLNESS EXAMINATION: ICD-10-CM

## 2022-03-03 DIAGNOSIS — Z11.59 NEED FOR HEPATITIS C SCREENING TEST: ICD-10-CM

## 2022-03-03 PROCEDURE — 3008F BODY MASS INDEX DOCD: CPT | Performed by: INTERNAL MEDICINE

## 2022-03-03 PROCEDURE — 3725F SCREEN DEPRESSION PERFORMED: CPT | Performed by: INTERNAL MEDICINE

## 2022-03-03 PROCEDURE — 99395 PREV VISIT EST AGE 18-39: CPT | Performed by: INTERNAL MEDICINE

## 2022-03-03 PROCEDURE — 1036F TOBACCO NON-USER: CPT | Performed by: INTERNAL MEDICINE

## 2022-03-03 NOTE — PATIENT INSTRUCTIONS
Problem List Items Addressed This Visit        Other    Encounter for wellness examination       Discussed preventative health, cancer screening, immunizations, and safety issues           Pure hypercholesterolemia - Primary    Relevant Orders    CBC and differential    Comprehensive metabolic panel    Lipid Panel with Direct LDL reflex    TSH, 3rd generation with Free T4 reflex      Other Visit Diagnoses     Need for hepatitis C screening test        Relevant Orders    Hepatitis C Antibody ([facility])    Screening for HIV (human immunodeficiency virus)        Relevant Orders    HIV 1/2 Antigen/Antibody (4th Generation) w Reflex [facility]    Encounter for immunization        Vitamin D deficiency        Relevant Orders    Vitamin D 25 hydroxy

## 2022-03-03 NOTE — PROGRESS NOTES
Assessment/Plan:    Encounter for wellness examination    Discussed preventative health, cancer screening, immunizations, and safety issues  Diagnoses and all orders for this visit:    Pure hypercholesterolemia  -     CBC and differential; Future  -     Comprehensive metabolic panel; Future  -     Lipid Panel with Direct LDL reflex; Future  -     TSH, 3rd generation with Free T4 reflex; Future    Need for hepatitis C screening test  -     Hepatitis C Antibody ([facility]); Future    Screening for HIV (human immunodeficiency virus)  -     HIV 1/2 Antigen/Antibody (4th Generation) w Reflex [facility]; Future    Encounter for immunization    Vitamin D deficiency  -     Vitamin D 25 hydroxy; Future    Encounter for wellness examination        BMI Counseling: Body mass index is 31 57 kg/m²  The BMI is above normal  Nutrition recommendations include encouraging healthy choices of fruits and vegetables and moderation in carbohydrate intake  Exercise recommendations include exercising 3-5 times per week  Rationale for BMI follow-up plan is due to patient being overweight or obese  Depression Screening and Follow-up Plan: Patient was screened for depression during today's encounter  They screened negative with a PHQ-2 score of 0  Subjective:      Patient ID: Rosalie Xiao is a 46 y o  male  Wellness:Sees the dentist regularly, no smoking cigarettes, patient eats healthy diet and exercises  The following portions of the patient's history were reviewed and updated as appropriate: allergies, current medications, past family history, past medical history, past social history, past surgical history and problem list     Review of Systems   Constitutional: Negative for chills, fatigue and fever  HENT: Negative for congestion, nosebleeds, postnasal drip, sore throat and trouble swallowing  Eyes: Negative for pain  Respiratory: Negative for cough, chest tightness, shortness of breath and wheezing  Cardiovascular: Negative for chest pain, palpitations and leg swelling  Gastrointestinal: Negative for abdominal pain, constipation, diarrhea, nausea and vomiting  Endocrine: Negative for polydipsia and polyuria  Genitourinary: Negative for dysuria, flank pain and hematuria  Musculoskeletal: Negative for arthralgias, back pain and myalgias  Skin: Negative for rash  Neurological: Negative for dizziness, tremors, light-headedness and headaches  Hematological: Does not bruise/bleed easily  Psychiatric/Behavioral: Negative for confusion and dysphoric mood  The patient is not nervous/anxious  Objective:      /74   Pulse 62   Resp 16   Ht 5' 9" (1 753 m)   Wt 97 kg (213 lb 12 8 oz)   SpO2 97%   BMI 31 57 kg/m²          Physical Exam  Vitals reviewed  Constitutional:       General: He is not in acute distress  Appearance: Normal appearance  He is well-developed  HENT:      Head: Normocephalic and atraumatic  Right Ear: External ear normal       Left Ear: External ear normal       Nose: Nose normal    Eyes:      General: No scleral icterus  Conjunctiva/sclera: Conjunctivae normal    Neck:      Thyroid: No thyromegaly  Trachea: No tracheal deviation  Cardiovascular:      Rate and Rhythm: Normal rate and regular rhythm  Heart sounds: Normal heart sounds  No murmur heard  Pulmonary:      Effort: Pulmonary effort is normal  No respiratory distress  Breath sounds: Normal breath sounds  No wheezing or rales  Abdominal:      General: Bowel sounds are normal       Palpations: Abdomen is soft  Tenderness: There is no abdominal tenderness  There is no guarding or rebound  Hernia: There is no hernia in the left inguinal area or right inguinal area  Genitourinary:     Testes: Normal    Musculoskeletal:      Cervical back: Normal range of motion and neck supple  Right lower leg: No edema  Left lower leg: No edema     Lymphadenopathy: Cervical: No cervical adenopathy  Skin:     Coloration: Skin is not jaundiced or pale  Neurological:      General: No focal deficit present  Mental Status: He is alert and oriented to person, place, and time  Psychiatric:         Mood and Affect: Mood normal          Behavior: Behavior normal          Thought Content:  Thought content normal          Judgment: Judgment normal

## 2022-05-13 LAB
EXTERNAL HIV SCREEN: NORMAL
HCV AB SER-ACNC: NEGATIVE

## 2022-10-12 PROBLEM — R50.9 FEVER: Status: RESOLVED | Noted: 2021-04-13 | Resolved: 2022-10-12

## 2023-01-24 DIAGNOSIS — F41.9 ANXIETY: ICD-10-CM

## 2023-01-24 RX ORDER — ESCITALOPRAM OXALATE 10 MG/1
TABLET ORAL
Qty: 90 TABLET | Refills: 0 | Status: SHIPPED | OUTPATIENT
Start: 2023-01-24

## 2023-02-27 ENCOUNTER — RA CDI HCC (OUTPATIENT)
Dept: OTHER | Facility: HOSPITAL | Age: 43
End: 2023-02-27

## 2023-02-27 NOTE — PROGRESS NOTES
[facility]       Chart reviewed, no opportunity found: CHART REVIEWED, NO OPPORTUNITY FOUND        Patients Insurance        Commercial Insurance: [address] [place] [address]

## 2023-05-01 ENCOUNTER — HOSPITAL ENCOUNTER (EMERGENCY)
Facility: HOSPITAL | Age: 43
Discharge: HOME | End: 2023-05-01
Attending: EMERGENCY MEDICINE

## 2023-05-01 ENCOUNTER — APPOINTMENT (EMERGENCY)
Dept: RADIOLOGY | Facility: HOSPITAL | Age: 43
End: 2023-05-01

## 2023-05-01 VITALS
OXYGEN SATURATION: 100 % | TEMPERATURE: 98.1 F | DIASTOLIC BLOOD PRESSURE: 55 MMHG | RESPIRATION RATE: 16 BRPM | SYSTOLIC BLOOD PRESSURE: 106 MMHG | BODY MASS INDEX: 31.75 KG/M2 | WEIGHT: 215 LBS | HEART RATE: 68 BPM

## 2023-05-01 DIAGNOSIS — M43.06 PARS DEFECT OF LUMBAR SPINE: ICD-10-CM

## 2023-05-01 DIAGNOSIS — M54.9 BACK PAIN: Primary | ICD-10-CM

## 2023-05-01 DIAGNOSIS — M51.36 DDD (DEGENERATIVE DISC DISEASE), LUMBAR: ICD-10-CM

## 2023-05-01 LAB
ALBUMIN SERPL BCP-MCNC: 4 G/DL (ref 3.5–5)
ALP SERPL-CCNC: 72 U/L (ref 46–116)
ALT SERPL W P-5'-P-CCNC: 53 U/L (ref 12–78)
ANION GAP SERPL CALCULATED.3IONS-SCNC: 2 MMOL/L (ref 4–13)
AST SERPL W P-5'-P-CCNC: 21 U/L (ref 5–45)
BACTERIA UR QL AUTO: NORMAL /HPF
BASOPHILS # BLD AUTO: 0.03 THOUSANDS/ΜL (ref 0–0.1)
BASOPHILS NFR BLD AUTO: 1 % (ref 0–1)
BILIRUB SERPL-MCNC: 0.81 MG/DL (ref 0.2–1)
BILIRUB UR QL STRIP: NEGATIVE
BUN SERPL-MCNC: 10 MG/DL (ref 5–25)
CALCIUM SERPL-MCNC: 8.7 MG/DL (ref 8.3–10.1)
CHLORIDE SERPL-SCNC: 109 MMOL/L (ref 96–108)
CLARITY UR: CLEAR
CO2 SERPL-SCNC: 25 MMOL/L (ref 21–32)
COLOR UR: YELLOW
COLOR, POC: COLORLESS
CREAT SERPL-MCNC: 1.05 MG/DL (ref 0.6–1.3)
EOSINOPHIL # BLD AUTO: 0.06 THOUSAND/ΜL (ref 0–0.61)
EOSINOPHIL NFR BLD AUTO: 1 % (ref 0–6)
ERYTHROCYTE [DISTWIDTH] IN BLOOD BY AUTOMATED COUNT: 11.6 % (ref 11.6–15.1)
GFR SERPL CREATININE-BSD FRML MDRD: 88 ML/MIN/1.73SQ M
GLUCOSE SERPL-MCNC: 102 MG/DL (ref 65–140)
GLUCOSE UR STRIP-MCNC: NEGATIVE MG/DL
HCT VFR BLD AUTO: 42.9 % (ref 36.5–49.3)
HGB BLD-MCNC: 15.8 G/DL (ref 12–17)
HGB UR QL STRIP.AUTO: ABNORMAL
IMM GRANULOCYTES # BLD AUTO: 0.02 THOUSAND/UL (ref 0–0.2)
IMM GRANULOCYTES NFR BLD AUTO: 0 % (ref 0–2)
KETONES UR STRIP-MCNC: NEGATIVE MG/DL
LEUKOCYTE ESTERASE UR QL STRIP: NEGATIVE
LIPASE SERPL-CCNC: 96 U/L (ref 73–393)
LYMPHOCYTES # BLD AUTO: 1.68 THOUSANDS/ΜL (ref 0.6–4.47)
LYMPHOCYTES NFR BLD AUTO: 34 % (ref 14–44)
MCH RBC QN AUTO: 31.8 PG (ref 26.8–34.3)
MCHC RBC AUTO-ENTMCNC: 36.8 G/DL (ref 31.4–37.4)
MCV RBC AUTO: 86 FL (ref 82–98)
MONOCYTES # BLD AUTO: 0.45 THOUSAND/ΜL (ref 0.17–1.22)
MONOCYTES NFR BLD AUTO: 9 % (ref 4–12)
NEUTROPHILS # BLD AUTO: 2.76 THOUSANDS/ΜL (ref 1.85–7.62)
NEUTS SEG NFR BLD AUTO: 55 % (ref 43–75)
NITRITE UR QL STRIP: NEGATIVE
NON-SQ EPI CELLS URNS QL MICRO: NORMAL /HPF
NRBC BLD AUTO-RTO: 0 /100 WBCS
PH UR STRIP.AUTO: 6.5 [PH] (ref 4.5–8)
PLATELET # BLD AUTO: 206 THOUSANDS/UL (ref 149–390)
PMV BLD AUTO: 9.1 FL (ref 8.9–12.7)
POTASSIUM SERPL-SCNC: 3.9 MMOL/L (ref 3.5–5.3)
PROT SERPL-MCNC: 7.1 G/DL (ref 6.4–8.4)
PROT UR STRIP-MCNC: NEGATIVE MG/DL
RBC # BLD AUTO: 4.97 MILLION/UL (ref 3.88–5.62)
RBC #/AREA URNS AUTO: NORMAL /HPF
SODIUM SERPL-SCNC: 136 MMOL/L (ref 135–147)
SP GR UR STRIP.AUTO: 1.02 (ref 1–1.03)
UROBILINOGEN UR QL STRIP.AUTO: 0.2 E.U./DL
WBC # BLD AUTO: 5 THOUSAND/UL (ref 4.31–10.16)
WBC #/AREA URNS AUTO: NORMAL /HPF

## 2023-05-01 RX ORDER — METHOCARBAMOL 750 MG/1
750 TABLET, FILM COATED ORAL 3 TIMES DAILY
Qty: 9 TABLET | Refills: 0 | Status: SHIPPED | OUTPATIENT
Start: 2023-05-01 | End: 2023-05-04

## 2023-05-01 RX ORDER — KETOROLAC TROMETHAMINE 30 MG/ML
15 INJECTION, SOLUTION INTRAMUSCULAR; INTRAVENOUS ONCE
Status: COMPLETED | OUTPATIENT
Start: 2023-05-01 | End: 2023-05-01

## 2023-05-01 RX ORDER — NAPROXEN 500 MG/1
500 TABLET ORAL 2 TIMES DAILY WITH MEALS
Qty: 14 TABLET | Refills: 0 | Status: SHIPPED | OUTPATIENT
Start: 2023-05-01 | End: 2023-05-08

## 2023-05-01 RX ORDER — ONDANSETRON 2 MG/ML
4 INJECTION INTRAMUSCULAR; INTRAVENOUS ONCE
Status: COMPLETED | OUTPATIENT
Start: 2023-05-01 | End: 2023-05-01

## 2023-05-01 RX ADMIN — MORPHINE SULFATE 2 MG: 2 INJECTION, SOLUTION INTRAMUSCULAR; INTRAVENOUS at 12:59

## 2023-05-01 RX ADMIN — ONDANSETRON 4 MG: 2 INJECTION INTRAMUSCULAR; INTRAVENOUS at 10:44

## 2023-05-01 RX ADMIN — SODIUM CHLORIDE 1000 ML: 0.9 INJECTION, SOLUTION INTRAVENOUS at 10:44

## 2023-05-01 RX ADMIN — IOHEXOL 100 ML: 350 INJECTION, SOLUTION INTRAVENOUS at 11:36

## 2023-05-01 RX ADMIN — KETOROLAC TROMETHAMINE 15 MG: 30 INJECTION, SOLUTION INTRAMUSCULAR; INTRAVENOUS at 10:44

## 2023-05-01 NOTE — Clinical Note
Emiliana Sharpe was seen and treated in our emergency department on 5/1/2023  No restrictions            Diagnosis:     Sachin Field  may return to work on return date  He may return on this date: 05/02/2023         If you have any questions or concerns, please don't hesitate to call        Sierra Martinez PA-C    ______________________________           _______________          _______________  Hospital Representative                              Date                                Time

## 2023-05-01 NOTE — ED PROVIDER NOTES
History  Chief Complaint   Patient presents with    Back Pain     Patient reports low back pain for 4 days  Denies urinary symptoms  72-year-old male presents emergency room for evaluation of left mid back pain  Onset about 1 week ago while at rest   Denies lifting or bending injury  Patient is concerned he has a kidney stone however states he has never been diagnosed with that before  Pain feels more deep than muscular  Last night and this morning pain seems to be worse and he has developed nausea  Ibuprofen has not been helping  Denies fever  Denies vomiting  Denies chest pain or shortness of breath  Sometimes the pain radiates history higher in his back and into the left side of his abdomen  Denies back pain radiating down his legs  Denies saddle anesthesia  Denies and has apparently bowel or bladder dysfunction  Denies rash  Denies recreational drug use  Admits getting back pain in the past but states it usually goes away on it own, unlike this episode  History provided by:  Patient  Back Pain  Associated symptoms: no chest pain, no fever, no numbness and no weakness        Prior to Admission Medications   Prescriptions Last Dose Informant Patient Reported? Taking?   escitalopram (LEXAPRO) 10 mg tablet   No No   Sig: TAKE 1 TABLET BY MOUTH EVERY DAY      Facility-Administered Medications: None       Past Medical History:   Diagnosis Date    Basal cell carcinoma (BCC) of scalp or skin of neck     Cancer ([facility] )     DDD (degenerative disc disease), lumbar 7/30/2018       Past Surgical History:   Procedure Laterality Date    NO PAST SURGERIES         Family History   Problem Relation Age of Onset    Coronary artery disease Father     Diabetes Father      I have reviewed and agree with the history as documented      E-Cigarette/Vaping     E-Cigarette/Vaping Substances     Social History     Tobacco Use    Smoking status: Never    Smokeless tobacco: Never   Substance Use Topics    Alcohol use: Yes     Comment: social    Drug use: No       Review of Systems   Constitutional: Negative for chills and fever  Respiratory: Negative for shortness of breath  Cardiovascular: Negative for chest pain  Gastrointestinal: Positive for nausea  Negative for vomiting  Genitourinary: Negative for difficulty urinating  Musculoskeletal: Positive for back pain  Skin: Negative for rash and wound  Neurological: Negative for weakness and numbness  All other systems reviewed and are negative  Physical Exam  Physical Exam  Vitals and nursing note reviewed  Constitutional:       Appearance: He is well-developed  HENT:      Right Ear: External ear normal       Left Ear: External ear normal    Eyes:      Conjunctiva/sclera: Conjunctivae normal    Cardiovascular:      Rate and Rhythm: Normal rate and regular rhythm  Heart sounds: Normal heart sounds  Pulmonary:      Effort: Pulmonary effort is normal       Breath sounds: Normal breath sounds  Abdominal:      General: Bowel sounds are normal  There is no distension  Palpations: Abdomen is soft  Tenderness: There is abdominal tenderness (mild left side)  There is left CVA tenderness (mild)  Musculoskeletal:      Cervical back: Neck supple  Thoracic back: Normal  No signs of trauma, tenderness or bony tenderness  Normal range of motion  Lumbar back: No signs of trauma, tenderness or bony tenderness  Negative right straight leg raise test and negative left straight leg raise test         Back:       Comments: 5/5 Strength BLE flexion and extension  No foot drop  Able to walk     Skin:     General: Skin is warm and dry  Findings: No rash  Neurological:      Mental Status: He is alert and oriented to person, place, and time     Psychiatric:         Mood and Affect: Mood normal          Vital Signs  ED Triage Vitals   Temperature Pulse Respirations Blood Pressure SpO2   05/01/23 1001 05/01/23 0959 05/01/23 0959 05/01/23 0959 05/01/23 0959   98 1 °F (36 7 °C) 65 18 (!) 166/110 96 %      Temp Source Heart Rate Source Patient Position - Orthostatic VS BP Location FiO2 (%)   05/01/23 1001 05/01/23 0959 05/01/23 0959 05/01/23 0959 --   Oral Monitor Sitting Left arm       Pain Score       05/01/23 0959       8           Vitals:    05/01/23 0959 05/01/23 1227   BP: (!) 166/110 106/55   Pulse: 65 68   Patient Position - Orthostatic VS: Sitting Lying         Visual Acuity      ED Medications  Medications   sodium chloride 0 9 % bolus 1,000 mL (0 mL Intravenous Stopped 5/1/23 1144)   ondansetron (ZOFRAN) injection 4 mg (4 mg Intravenous Given 5/1/23 1044)   ketorolac (TORADOL) injection 15 mg (15 mg Intravenous Given 5/1/23 1044)   iohexol (OMNIPAQUE) 350 MG/ML injection (SINGLE-DOSE) 100 mL (100 mL Intravenous Given 5/1/23 1136)   morphine injection 2 mg (2 mg Intravenous Given 5/1/23 1259)       Diagnostic Studies  Results Reviewed     Procedure Component Value Units Date/Time    Urine Microscopic [[id number]]  (Normal) Collected: 05/01/23 1049    Lab Status: Final result Specimen: Urine, Clean Catch Updated: 05/01/23 1139     RBC, UA None Seen /hpf      WBC, UA None Seen /hpf      Epithelial Cells None Seen /hpf      Bacteria, UA None Seen /hpf     Comprehensive metabolic panel [[id number]]  (Abnormal) Collected: 05/01/23 1042    Lab Status: Final result Specimen: Blood from Arm, Left Updated: 05/01/23 1119     Sodium 136 mmol/L      Potassium 3 9 mmol/L      Chloride 109 mmol/L      CO2 25 mmol/L      ANION GAP 2 mmol/L      BUN 10 mg/dL      Creatinine 1 05 mg/dL      Glucose 102 mg/dL      Calcium 8 7 mg/dL      AST 21 U/L      ALT 53 U/L      Alkaline Phosphatase 72 U/L      Total Protein 7 1 g/dL      Albumin 4 0 g/dL      Total Bilirubin 0 81 mg/dL      eGFR 88 ml/min/1 73sq m     Narrative:      [place] guidelines for Chronic Kidney Disease (CKD):     Stage 1 with normal or high GFR (GFR > 90 mL/min/1 73 square meters)    Stage 2 Mild CKD (GFR = 60-89 mL/min/1 73 square meters)    Stage 3A Moderate CKD (GFR = 45-59 mL/min/1 73 square meters)    Stage 3B Moderate CKD (GFR = 30-44 mL/min/1 73 square meters)    Stage 4 Severe CKD (GFR = 15-29 mL/min/1 73 square meters)    Stage 5 End Stage CKD (GFR <15 mL/min/1 73 square meters)  Note: GFR calculation is accurate only with a steady state creatinine    Lipase [[id number]]  (Normal) Collected: 05/01/23 1042    Lab Status: Final result Specimen: Blood from Arm, Left Updated: 05/01/23 1119     Lipase 96 u/L     CBC and differential [[id number]] Collected: 05/01/23 1042    Lab Status: Final result Specimen: Blood from Arm, Left Updated: 05/01/23 1053     WBC 5 00 Thousand/uL      RBC 4 97 Million/uL      Hemoglobin 15 8 g/dL      Hematocrit 42 9 %      MCV 86 fL      MCH 31 8 pg      MCHC 36 8 g/dL      RDW 11 6 %      MPV 9 1 fL      Platelets 176 Thousands/uL      nRBC 0 /100 WBCs      Neutrophils Relative 55 %      Immat GRANS % 0 %      Lymphocytes Relative 34 %      Monocytes Relative 9 %      Eosinophils Relative 1 %      Basophils Relative 1 %      Neutrophils Absolute 2 76 Thousands/µL      Immature Grans Absolute 0 02 Thousand/uL      Lymphocytes Absolute 1 68 Thousands/µL      Monocytes Absolute 0 45 Thousand/µL      Eosinophils Absolute 0 06 Thousand/µL      Basophils Absolute 0 03 Thousands/µL     POCT urinalysis dipstick [[id number]]  (Normal) Resulted: 05/01/23 1052    Lab Status: Final result Specimen: Urine Updated: 05/01/23 1053     Color, UA Colorless     Clarity, UA --     EXT Leukocytes, UA --     Nitrite, UA --     Protein, UA -- mg/dl      Glucose, UA --     Ketones, UA -- mg/dl      EXT Urobilinogen, UA --      Bilirubin, UA --     Blood, UA --    Urine Macroscopic, POC [[id number]]  (Abnormal) Collected: 05/01/23 1049    Lab Status: Final result Specimen: Urine Updated: 05/01/23 1051     Color, UA Yellow     Clarity, UA Clear     pH, UA 6 5     Leukocytes, UA Negative     Nitrite, UA Negative     Protein, UA Negative mg/dl      Glucose, UA Negative mg/dl      Ketones, UA Negative mg/dl      Urobilinogen, UA 0 2 E U /dl      Bilirubin, UA Negative     Occult Blood, UA Trace     Specific Shade, UA 1 020    Narrative:      CLINITEK RESULT                 CT abdomen pelvis with contrast   Final Result by Edilberto Flores MD (05/01 1315)      No evidence of acute abdominopelvic process  Chronic bilateral pars defect at L4 with stable grade 1 anterolisthesis of L4 on L5  Degenerative changes at L4-5 however have progressed since June 2018  Workstation performed: [id number]                    Procedures  Procedures         ED Course  ED Course as of 05/01/23 1336   Mon May 01, 2023   1059 Nausea improved   1330 Pain improved, reviewed CT results with patient, encouraged f/u with spine program                                             Medical Decision Making  Differential diagnosis includes but is not limited to: kidney stone, pyleo, mass, muscular    Amount and/or Complexity of Data Reviewed  Labs: ordered  Details: reviewed  Radiology: ordered  Details: reviewed      Risk  Prescription drug management  Disposition  Final diagnoses:   Back pain   Pars defect of lumbar spine   DDD (degenerative disc disease), lumbar     Time reflects when diagnosis was documented in both MDM as applicable and the Disposition within this note     Time User Action Codes Description Comment    5/1/2023  1:33 PM Ashly CABEZAS Add [M54 9] Back pain     5/1/2023  1:35 PM Narciso Mack Add [M43 06] Pars defect of lumbar spine     5/1/2023  1:36 PM Narciso Mack Add [M51 36] DDD (degenerative disc disease), lumbar       ED Disposition     ED Disposition   Discharge    Condition   Stable    Date/Time   Mon May 1, 2023  1:33 PM    Comment   Brooke Edwards discharge to home/self care                 Follow-up Information Follow up With Specialties Details Why Contact Info Additional Information    [facility] Physical Therapy In 3 days  [phone] [facility] [place] [address] Emergency Department Emergency Medicine  If symptoms worsen Stevie [address] [id number]  [address] Emergency Department, [address], [address] [phone]          Patient's Medications   Discharge Prescriptions    METHOCARBAMOL (ROBAXIN) 750 MG TABLET    Take 1 tablet (750 mg total) by mouth 3 (three) times a day for 3 days       Start Date: 5/1/2023  End Date: 5/4/2023       Order Dose: 750 mg       Quantity: 9 tablet    Refills: 0    NAPROXEN (NAPROSYN) 500 MG TABLET    Take 1 tablet (500 mg total) by mouth 2 (two) times a day with meals for 7 days       Start Date: 5/1/2023  End Date: 5/8/2023       Order Dose: 500 mg       Quantity: 14 tablet    Refills: 0       No discharge procedures on file      PDMP Review     None          ED Provider  Electronically Signed by           Andrea Baum PA-C  05/01/23 0317

## 2023-05-02 ENCOUNTER — NURSE TRIAGE (OUTPATIENT)
Dept: PHYSICAL THERAPY | Facility: OTHER | Age: 43
End: 2023-05-02

## 2023-05-02 DIAGNOSIS — M54.50 ACUTE LEFT-SIDED LOW BACK PAIN WITHOUT SCIATICA: ICD-10-CM

## 2023-05-02 DIAGNOSIS — M54.6 ACUTE LEFT-SIDED THORACIC BACK PAIN: Primary | ICD-10-CM

## 2023-05-02 NOTE — TELEPHONE ENCOUNTER
Additional Information   Negative: Has the patient had unexplained weight loss?  Negative: Does the patient have a fever?  Negative: Is the patient experiencing blood in sputum?  Negative: Is the patient experiencing urine retention?  Negative: Has the patient experienced major trauma? (fall from height, high speed collision, direct blow to spine) and is also experiencing nausea, light-headedness, or loss of consciousness?  Negative: Is the patient experiencing acute drop foot or paralysis?  Negative: Is this a chronic condition? Protocols used: [facility] COMPREH[facility]IVE SPINE PROGRAM PROTOCOL    This RN did review in detail the Comprehensive [facility] and what we can provide for their back pain  Patient is agreeable to being triaged by this RN and would like to proceed with Physical Therapy  Referral was placed for Physical Therapy at the [facility]  Patients information was sent to the  to make evaluation appointment  Patient made aware that the PT office  will be calling to schedule the appointment  Patient was provided with the phone number to the PT office  No further questions and/or concerns were voiced by the patient at this time  Patient states understanding of the referral that was placed  Referral Closed

## 2023-05-02 NOTE — TELEPHONE ENCOUNTER
Additional Information   Negative: Is this related to a work injury?  Negative: Is this related to an MVA?  Negative: Are you currently recieving homecare services? Background - Initial Assessment  Clinical complaint: Pain left mid and low back, no radiation into legs  No numbness or tingling  Started 3 weeks ago as a constant dull pain  Got worse 5 days ago  Was seen in ED 5/1/23 [facility]  Date of onset: 3 weeks  Frequency of pain: constant  Quality of pain: constant dull pain, when standing trying to walk is shooting      Protocols used: [facility] COMPREHENSIVE SPINE PROGRAM PROTOCOL

## 2023-05-03 ENCOUNTER — OFFICE VISIT (OUTPATIENT)
Dept: INTERNAL MEDICINE CLINIC | Facility: CLINIC | Age: 43
End: 2023-05-03

## 2023-05-03 VITALS
SYSTOLIC BLOOD PRESSURE: 128 MMHG | HEIGHT: 69 IN | DIASTOLIC BLOOD PRESSURE: 78 MMHG | WEIGHT: 216.4 LBS | OXYGEN SATURATION: 98 % | BODY MASS INDEX: 32.05 KG/M2 | HEART RATE: 75 BPM

## 2023-05-03 DIAGNOSIS — E78.00 PURE HYPERCHOLESTEROLEMIA: ICD-10-CM

## 2023-05-03 DIAGNOSIS — E55.9 VITAMIN D DEFICIENCY: ICD-10-CM

## 2023-05-03 DIAGNOSIS — R10.9 ACUTE LEFT FLANK PAIN: Primary | ICD-10-CM

## 2023-05-03 DIAGNOSIS — Z12.5 SCREENING FOR PROSTATE CANCER: ICD-10-CM

## 2023-05-03 DIAGNOSIS — Z23 ENCOUNTER FOR IMMUNIZATION: ICD-10-CM

## 2023-05-03 DIAGNOSIS — Z00.00 ENCOUNTER FOR WELLNESS EXAMINATION: ICD-10-CM

## 2023-05-03 RX ORDER — OXYCODONE HYDROCHLORIDE AND ACETAMINOPHEN 5; 325 MG/1; MG/1
1 TABLET ORAL EVERY 6 HOURS PRN
Qty: 20 TABLET | Refills: 0 | Status: SHIPPED | OUTPATIENT
Start: 2023-05-03

## 2023-05-03 NOTE — PATIENT INSTRUCTIONS
Problem List Items Addressed This Visit          Other    Acute left flank pain - Primary     Keep a close eye on the urine for any changes  There is a possibility of a tiny stone not seen on imaging  Patient has been pushing fluids in case this were the etiology  No rash in the area to suggest shingles  CT scan of the abdomen pelvis reviewed which showed no acute abdominal process  Patient was referred to comprehensive spine program is going to see PT  Discussed with patient red flags to look for such as numbness in the bathing suit area, new bladder or bowel incontinence, and to reach out if these things were to occur  No foot drop on exam   Pt may have strained something playing baseball  Patient is using the anti-inflammatory and the muscle relaxant  Will gve short course of pain medication to use as needed, especially at night  Tramadol can interact with the escitalopram, so will use narcotic  Discussed with patient that if he is getting enough relief with the muscle relaxant and anti-inflammatory, he does not need to take the narcotic  I recommend trying a topical medication with lidocaine in it like Aspercreme before trying the narcotic           Relevant Medications    oxyCODONE-acetaminophen (Percocet) 5-325 mg per tablet    Encounter for wellness examination     Discussed preventative health, cancer screening, immunizations, and safety issues  Reports having a tetanus shot in the past 10 years  I recommend yearly flu shot             Pure hypercholesterolemia    Relevant Orders    Lipid Panel with Direct LDL reflex    TSH, 3rd generation with Free T4 reflex     Other Visit Diagnoses       Encounter for immunization        Screening for prostate cancer        Relevant Orders    PSA, Total Screen    Vitamin D deficiency        Relevant Orders    Vitamin D 25 hydroxy

## 2023-05-03 NOTE — ASSESSMENT & PLAN NOTE
Keep a close eye on the urine for any changes  There is a possibility of a tiny stone not seen on imaging  Patient has been pushing fluids in case this were the etiology  No rash in the area to suggest shingles  CT scan of the abdomen pelvis reviewed which showed no acute abdominal process  Patient was referred to comprehensive spine program is going to see PT  Discussed with patient red flags to look for such as numbness in the bathing suit area, new bladder or bowel incontinence, and to reach out if these things were to occur  No foot drop on exam   Pt may have strained something playing baseball  Patient is using the anti-inflammatory and the muscle relaxant  Will gve short course of pain medication to use as needed, especially at night  Tramadol can interact with the escitalopram, so will use narcotic  Discussed with patient that if he is getting enough relief with the muscle relaxant and anti-inflammatory, he does not need to take the narcotic    I recommend trying a topical medication with lidocaine in it like Aspercreme before trying the narcotic

## 2023-05-03 NOTE — PROGRESS NOTES
Name: Mónica Harding      : 1980      MRN: [id number]  Encounter Provider: Vaishali Trent MD  Encounter Date: 5/3/2023   Encounter department: [facility]    Assessment & Plan     1  Acute left flank pain  Assessment & Plan:  Keep a close eye on the urine for any changes  There is a possibility of a tiny stone not seen on imaging  Patient has been pushing fluids in case this were the etiology  No rash in the area to suggest shingles  CT scan of the abdomen pelvis reviewed which showed no acute abdominal process  Patient was referred to comprehensive spine program is going to see PT  Discussed with patient red flags to look for such as numbness in the bathing suit area, new bladder or bowel incontinence, and to reach out if these things were to occur  No foot drop on exam   Pt may have strained something playing baseball  Patient is using the anti-inflammatory and the muscle relaxant  Will gve short course of pain medication to use as needed, especially at night  Tramadol can interact with the escitalopram, so will use narcotic  Discussed with patient that if he is getting enough relief with the muscle relaxant and anti-inflammatory, he does not need to take the narcotic  I recommend trying a topical medication with lidocaine in it like Aspercreme before trying the narcotic    Orders:  -     oxyCODONE-acetaminophen (Percocet) 5-325 mg per tablet; Take 1 tablet by mouth every 6 (six) hours as needed for moderate pain Max Daily Amount: 4 tablets    2  Encounter for immunization    3  Screening for prostate cancer  -     PSA, Total Screen; Future    4  Pure hypercholesterolemia  -     Lipid Panel with Direct LDL reflex; Future  -     TSH, 3rd generation with Free T4 reflex; Future    5  Vitamin D deficiency  -     Vitamin D 25 hydroxy; Future    6   Encounter for wellness examination  Assessment & Plan:  Discussed preventative health, cancer screening, immunizations, and safety issues  Reports having a tetanus shot in the past 10 years  I recommend yearly flu shot  BMI Counseling: Body mass index is 31 76 kg/m²  The BMI is above normal  Nutrition recommendations include encouraging healthy choices of fruits and vegetables and moderation in carbohydrate intake  Exercise recommendations include exercising 3-5 times per week  Rationale for BMI follow-up plan is due to patient being overweight or obese  Depression Screening and Follow-up Plan: Patient was screened for depression during today's encounter  They screened negative with a PHQ-2 score of 0  Subjective     Patient was in the emergency room May 1 for mid left back pain  Notes from the emergency room reviewed including urinalysis, labs, and CT scan  Since out of the hospital pt is still having a lot of pain  No rash in area, no fevers, no chills, no change in bowels  Patient did have a little difficulty urinating at times, and was concerned about the possibility of kidney stone, no dysuria, he had a little microscopic blood in the urine  He has constant pain, and he can relieve the pain somewhat by decompressing the spine by leaning forward  Wellness: Patient gets routine dental care, no smoking cigarettes, eats a healthy diet, exercises    Review of Systems   Constitutional: Negative for chills, fatigue and fever  HENT: Negative for congestion, nosebleeds, postnasal drip, sore throat and trouble swallowing  Eyes: Negative for pain  Respiratory: Negative for cough, chest tightness, shortness of breath and wheezing  Cardiovascular: Negative for chest pain, palpitations and leg swelling  Gastrointestinal: Negative for abdominal pain, constipation, diarrhea, nausea and vomiting  Endocrine: Negative for polydipsia and polyuria  Genitourinary: Negative for dysuria, flank pain and hematuria  Musculoskeletal: Positive for back pain  Negative for arthralgias  Skin: Negative for rash  Neurological: Negative for dizziness, tremors and headaches  Hematological: Does not bruise/bleed easily  Psychiatric/Behavioral: Negative for confusion and dysphoric mood  The patient is not nervous/anxious  Past Medical History:   Diagnosis Date    Basal cell carcinoma (BCC) of scalp or skin of neck     [facility]     DDD (degenerative disc disease), lumbar 7/30/2018     Past Surgical History:   Procedure Laterality Date    NO PAST SURGERIES       Family History   Problem Relation Age of Onset    Coronary artery disease Father     Diabetes Father      Social History     Socioeconomic History    Marital status: /Civil Union     Spouse name: None    Number of children: None    Years of education: None    Highest education level: None   Occupational History    None   Tobacco Use    Smoking status: Never    Smokeless tobacco: Never   Vaping Use    Vaping Use: Never used   Substance and Sexual Activity    Alcohol use:  Yes     Alcohol/week: 1 0 standard drink     Types: 1 Cans of beer per week    Drug use: No    Sexual activity: Yes     Partners: Female   Other Topics Concern    None   Social History Narrative             Social Determinants of Health     Financial Resource Strain: Not on file   Food Insecurity: Not on file   Transportation Needs: Not on file   Physical Activity: Not on file   Stress: Not on file   Social Connections: Not on file   Intimate Partner Violence: Not on file   Housing Stability: Not on file     Current Outpatient Medications on File Prior to Visit   Medication Sig    escitalopram (LEXAPRO) 10 mg tablet TAKE 1 TABLET BY MOUTH EVERY DAY    methocarbamol (ROBAXIN) 750 mg tablet Take 1 tablet (750 mg total) by mouth 3 (three) times a day for 3 days    naproxen (NAPROSYN) 500 mg tablet Take 1 tablet (500 mg total) by mouth 2 (two) times a day with meals for 7 days     No Known Allergies  Immunization History   Administered Date(s) "Administered    INFLUENZA 01/01/2016    Influenza Quadrivalent Preservative Free 3 years and older IM 01/01/2016    Influenza Recombinant Preservative Free Im 11/04/2018    Influenza, injectable, quadrivalent, preservative free 0 5 mL 10/26/2019, 03/02/2021       Objective     /78   Pulse 75   Ht 5' 9 21\" (1 758 m)   Wt 98 2 kg (216 lb 6 4 oz)   SpO2 98%   BMI 31 76 kg/m²     Physical Exam  Constitutional:       General: He is not in acute distress  Appearance: Normal appearance  He is well-developed  HENT:      Head: Normocephalic and atraumatic  Right Ear: External ear normal       Left Ear: External ear normal    Eyes:      General: No scleral icterus  Conjunctiva/sclera: Conjunctivae normal    Neck:      Thyroid: No thyromegaly  Trachea: No tracheal deviation  Cardiovascular:      Rate and Rhythm: Normal rate and regular rhythm  Heart sounds: Normal heart sounds  No murmur heard  Pulmonary:      Effort: Pulmonary effort is normal  No respiratory distress  Breath sounds: Normal breath sounds  No wheezing or rales  Abdominal:      General: Bowel sounds are normal       Palpations: Abdomen is soft  Tenderness: There is no abdominal tenderness  There is left CVA tenderness  There is no right CVA tenderness, guarding or rebound  Hernia: There is no hernia in the left inguinal area or right inguinal area  Genitourinary:     Testes: Normal    Musculoskeletal:      Cervical back: Normal range of motion and neck supple  Right lower leg: No edema  Left lower leg: No edema  Comments: Straight leg raise tests negative bilaterally   Lymphadenopathy:      Cervical: No cervical adenopathy  Skin:     Coloration: Skin is not jaundiced or pale  Findings: No rash  Neurological:      Mental Status: He is alert and oriented to person, place, and time        Comments: Normal gait, no foot drop   Psychiatric:         Behavior: Behavior normal          " Thought Content:  Thought content normal          Judgment: Judgment normal        Hiram Savage MD

## 2023-05-03 NOTE — ASSESSMENT & PLAN NOTE
Discussed preventative health, cancer screening, immunizations, and safety issues  Reports having a tetanus shot in the past 10 years  I recommend yearly flu shot

## 2023-05-05 ENCOUNTER — TELEPHONE (OUTPATIENT)
Dept: INTERNAL MEDICINE CLINIC | Facility: CLINIC | Age: 43
End: 2023-05-05

## 2023-05-05 ENCOUNTER — NURSE TRIAGE (OUTPATIENT)
Dept: OTHER | Facility: OTHER | Age: 43
End: 2023-05-05

## 2023-05-05 NOTE — TELEPHONE ENCOUNTER
Patient has been seen for back pain that started 3 weeks ago  Patient has tried muscle relaxer, naproxen, and percocet with minimal relief  Patient unsure he can make it to the physical therapy appt on 5/9  Still with severe pain  Requesting suggestions for the weekend  Please advise

## 2023-05-05 NOTE — TELEPHONE ENCOUNTER
"Regarding: lower back pain / excruciating  pain  / can't wait for the appt  on the 9th   ----- Message from Lisa Kellogg sent at 5/5/2023  2:46 PM EDT -----  \"I am having very bad lower lumbar back pain  I have an appt  On the 9th with Spine and pain but I don't think I can make it to the 9th  I'm in so much pain  I'd like to speak to Dr Hakan Mauro  \"    "

## 2023-05-05 NOTE — TELEPHONE ENCOUNTER
Patient called stating he saw Dr Anderson Rocha on 5/3 fir back pain  Pt said the pain medication given is not helping  Patient said he cannot get into Physical Therapy until 5/9 -   Pt is asking what he should do? Patient is hunched over and uncomfortable in pain  Please advise      Sending to covering provider

## 2023-05-05 NOTE — TELEPHONE ENCOUNTER
"  Reason for Disposition   Back pain present > 2 weeks    Answer Assessment - Initial Assessment Questions  1  ONSET: \"When did the pain begin? \"       3 weeks ago, much worse this week  2  LOCATION: \"Where does it hurt? \" (upper, mid or lower back)      Lower back pain  3  SEVERITY: \"How bad is the pain? \"  (e g , Scale 1-10; mild, moderate, or severe)    - MILD (1-3): doesn't interfere with normal activities     - MODERATE (4-7): interferes with normal activities or awakens from sleep     - SEVERE (8-10): excruciating pain, unable to do any normal activities       severe  4  PATTERN: \"Is the pain constant? \" (e g , yes, no; constant, intermittent)       constant  5  RADIATION: \"Does the pain shoot into your legs or elsewhere? \"      Shoots up the back  6  CAUSE:  \"What do you think is causing the back pain? \"       unsure  8  MEDICATIONS: \"What have you taken so far for the pain? \" (e g , nothing, acetaminophen, NSAIDS)      Percocet  9  NEUROLOGIC SYMPTOMS: \"Do you have any weakness, numbness, or problems with bowel/bladder control? \"      denies  10  OTHER SYMPTOMS: \"Do you have any other symptoms? \" (e g , fever, abdominal pain, burning with urination, blood in urine)        denies    Protocols used: BACK PAIN-ADULT-AH    "

## 2023-05-08 ENCOUNTER — TELEMEDICINE (OUTPATIENT)
Dept: INTERNAL MEDICINE CLINIC | Facility: CLINIC | Age: 43
End: 2023-05-08

## 2023-05-08 DIAGNOSIS — R10.9 ACUTE LEFT FLANK PAIN: Primary | ICD-10-CM

## 2023-05-08 RX ORDER — METHYLPREDNISOLONE 4 MG/1
TABLET ORAL
Qty: 21 EACH | Refills: 0 | Status: SHIPPED | OUTPATIENT
Start: 2023-05-08

## 2023-05-08 RX ORDER — OXYCODONE HYDROCHLORIDE AND ACETAMINOPHEN 5; 325 MG/1; MG/1
1 TABLET ORAL EVERY 6 HOURS PRN
Qty: 20 TABLET | Refills: 0 | Status: SHIPPED | OUTPATIENT
Start: 2023-05-08

## 2023-05-08 NOTE — TELEPHONE ENCOUNTER
Addendum to other note  Also the patient has numbness below his belly button and has urgency to urinate but cannot  Tried Oxycodone but really did not help  He is out of the medication

## 2023-05-08 NOTE — PATIENT INSTRUCTIONS
Problem List Items Addressed This Visit          Other    Acute left flank pain - Primary     Patient having ongoing constant achy pain located left mid to lower back  The pain is severe at times  He has tried topical lidocaine patches, naproxen, muscle relaxant, and OxyContin  Nothing has helped except the OxyContin has helped a little  He has not noticed any blood in the urine, no fevers, no chills, no rash in the area, no significant nausea, no vomiting  The pain wakes him up at night  He denies any change in urine or bowels other than initially thought he may have had a little more difficulty urinating  He did have an MRI of the lumbar spine when in the hospital for this  He has since developed some numbness on his left side around the level of his bellybutton across to his back  He reports he has not been using lidocaine cream in this area  He is planning on starting physical therapy tomorrow, but is unsure if he will be able to do it due to the severity of the pain  Based on this information, I feel an MRI of the thoracic spine is necessary we will also start short course of oral steroids  As previously discussed, if patient were to have worsening symptoms or bladder or bowel incontinence, to get to the emergency room             Relevant Medications    methylPREDNISolone 4 MG tablet therapy pack    oxyCODONE-acetaminophen (Percocet) 5-325 mg per tablet

## 2023-05-08 NOTE — ASSESSMENT & PLAN NOTE
Patient having ongoing constant achy pain located left mid to lower back  The pain is severe at times  He has tried topical lidocaine patches, naproxen, muscle relaxant, and OxyContin  Nothing has helped except the OxyContin has helped a little  He has not noticed any blood in the urine, no fevers, no chills, no rash in the area, no significant nausea, no vomiting  The pain wakes him up at night  He denies any change in urine or bowels other than initially thought he may have had a little more difficulty urinating  He did have an MRI of the lumbar spine when in the hospital for this  He has since developed some numbness on his left side around the level of his bellybutton across to his back  He reports he has not been using lidocaine cream in this area  He is planning on starting physical therapy tomorrow, but is unsure if he will be able to do it due to the severity of the pain  Based on this information, I feel an MRI of the thoracic spine is necessary we will also start short course of oral steroids  As previously discussed, if patient were to have worsening symptoms or bladder or bowel incontinence, to get to the emergency room

## 2023-05-08 NOTE — PROGRESS NOTES
Virtual Regular Visit    Verification of patient location:    Patient is located at Home in the following state in which I hold an active license [place]      Assessment/Plan:    Problem List Items Addressed This Visit        Other    Acute left flank pain - Primary     Patient having ongoing constant achy pain located left mid to lower back  The pain is severe at times  He has tried topical lidocaine patches, naproxen, muscle relaxant, and OxyContin  Nothing has helped except the OxyContin has helped a little  He has not noticed any blood in the urine, no fevers, no chills, no rash in the area, no significant nausea, no vomiting  The pain wakes him up at night  He denies any change in urine or bowels other than initially thought he may have had a little more difficulty urinating  He did have an MRI of the lumbar spine when in the hospital for this  He has since developed some numbness on his left side around the level of his bellybutton across to his back  He reports he has not been using lidocaine cream in this area  He is planning on starting physical therapy tomorrow, but is unsure if he will be able to do it due to the severity of the pain  Based on this information, I feel an MRI of the thoracic spine is necessary we will also start short course of oral steroids  As previously discussed, if patient were to have worsening symptoms or bladder or bowel incontinence, to get to the emergency room  Relevant Medications    methylPREDNISolone 4 MG tablet therapy pack    oxyCODONE-acetaminophen (Percocet) 5-325 mg per tablet            Reason for visit is No chief complaint on file         Encounter provider Win Soto MD    Provider located at [address] [place] [id number]      Recent Visits  Date Type Provider Dept   05/05/23 Telephone [id number] [facility]   05/03/23 Office Visit Win Soto MD [facility]   Showing recent visits within past 7 days and meeting all other requirements  Today's Visits  Date Type Provider Dept   05/08/23 Telemedicine Tricia President, [employer] Hakan today's visits and meeting all other requirements  Future Appointments  No visits were found meeting these conditions  Showing future appointments within next 150 days and meeting all other requirements       The patient was identified by name and date of birth  Aidan Proctor was informed that this is a telemedicine visit and that the visit is being conducted through other (Embedded locked up/not-responding)   Hong Aguila My office door was closed  No one else was in the room  He acknowledged consent and understanding of privacy and security of the video platform  The patient has agreed to participate and understands they can discontinue the visit at any time  Patient is aware this is a billable service  Subjective  Aidan Proctor is a 38 y o  male    Patient having ongoing constant achy pain located left mid to lower back  The pain is severe at times  He has tried topical lidocaine patches, naproxen, muscle relaxant, and OxyContin  Nothing has helped except the OxyContin has helped a little  He has not noticed any blood in the urine, no fevers, no chills, no rash in the area, no significant nausea, no vomiting  The pain wakes him up at night  He denies any change in urine or bowels other than initially thought he may have had a little more difficulty urinating  He did have an MRI of the lumbar spine when in the hospital for this  He has since developed some numbness on his left side around the level of his bellybutton across to his back  He reports he has not been using lidocaine cream in this area         Past Medical History:   Diagnosis Date   • Basal cell carcinoma (BCC) of scalp or skin of neck    • Cancer (HCC)    • DDD (degenerative disc disease), lumbar 7/30/2018       Past Surgical History:   Procedure Laterality Date   • NO PAST SURGERIES         Current Outpatient Medications   Medication Sig Dispense Refill   • escitalopram (LEXAPRO) 10 mg tablet TAKE 1 TABLET BY MOUTH EVERY DAY 90 tablet 0   • methylPREDNISolone 4 MG tablet therapy pack Use as directed on package 21 each 0   • oxyCODONE-acetaminophen (Percocet) 5-325 mg per tablet Take 1 tablet by mouth every 6 (six) hours as needed for moderate pain Max Daily Amount: 4 tablets 20 tablet 0   • methocarbamol (ROBAXIN) 750 mg tablet Take 1 tablet (750 mg total) by mouth 3 (three) times a day for 3 days 9 tablet 0   • naproxen (NAPROSYN) 500 mg tablet Take 1 tablet (500 mg total) by mouth 2 (two) times a day with meals for 7 days 14 tablet 0     No current facility-administered medications for this visit  No Known Allergies    Review of Systems   Constitutional: Negative for chills and fever  Gastrointestinal: Negative for constipation, diarrhea, nausea and vomiting  Genitourinary: Negative for hematuria  Musculoskeletal: Positive for back pain  Skin: Negative for rash  Video Exam    There were no vitals filed for this visit  Physical Exam  Constitutional:       Appearance: Normal appearance  Pulmonary:      Effort: Pulmonary effort is normal  No respiratory distress  Skin:     Coloration: Skin is not jaundiced or pale  Neurological:      Mental Status: He is alert            Visit Time  Total Visit Duration: 25

## 2023-05-09 ENCOUNTER — HOSPITAL ENCOUNTER (OUTPATIENT)
Dept: RADIOLOGY | Facility: IMAGING CENTER | Age: 43
Discharge: HOME | End: 2023-05-09

## 2023-05-09 ENCOUNTER — TELEPHONE (OUTPATIENT)
Dept: INTERNAL MEDICINE CLINIC | Facility: CLINIC | Age: 43
End: 2023-05-09

## 2023-05-09 ENCOUNTER — EVALUATION (OUTPATIENT)
Dept: PHYSICAL THERAPY | Age: 43
End: 2023-05-09

## 2023-05-09 DIAGNOSIS — R20.0 NUMBNESS AND TINGLING SENSATION OF SKIN: ICD-10-CM

## 2023-05-09 DIAGNOSIS — R10.9 ACUTE LEFT FLANK PAIN: Primary | ICD-10-CM

## 2023-05-09 DIAGNOSIS — R20.2 NUMBNESS AND TINGLING SENSATION OF SKIN: ICD-10-CM

## 2023-05-09 DIAGNOSIS — R10.9 ACUTE LEFT FLANK PAIN: ICD-10-CM

## 2023-05-09 DIAGNOSIS — M54.50 ACUTE LEFT-SIDED LOW BACK PAIN WITHOUT SCIATICA: ICD-10-CM

## 2023-05-09 DIAGNOSIS — R20.2 NUMBNESS AND TINGLING SENSATION OF SKIN: Primary | ICD-10-CM

## 2023-05-09 DIAGNOSIS — M54.6 ACUTE LEFT-SIDED THORACIC BACK PAIN: Primary | ICD-10-CM

## 2023-05-09 DIAGNOSIS — G54.9 NERVE ROOT COMPRESSION: Primary | ICD-10-CM

## 2023-05-09 DIAGNOSIS — R20.0 NUMBNESS AND TINGLING SENSATION OF SKIN: Primary | ICD-10-CM

## 2023-05-09 NOTE — TELEPHONE ENCOUNTER
Patient is asking if imaging is going to be ordered  He thought it was mentioned at his appointment yesterday         Please advise

## 2023-05-09 NOTE — PROGRESS NOTES
PT Evaluation     Today's date: 2023  Patient name: Eyad Bridges  : 1980  MRN: [id number]  Referring provider: Willam Pritchard PT  Dx:   Encounter Diagnosis     ICD-10-CM    1  Acute left-sided thoracic back pain  M54 6 Ambulatory referral to PT spine      2  Acute left-sided low back pain without sciatica  M54 50 Ambulatory referral to PT spine                     Assessment  Assessment details: PT IE: 2323  Patient reported he has had steady 9 of 10 pain for the last 8-9 days  Patient noted he played baseball 4 weeks ago, without a specific episode  Patient noted then 3 weeks ago he started to have an ache on the left side which has progressed to severe pain over the last 8-9 days  Patient reported he had a CT scan which he noted cleared his kidney and stones  Patient noted he saw his PCP, which he got his approval from his PCP regarding an MRI  Patient noted he has constant severe lumbar and thoracic spine pain, numbness into the lower lumbar spine that raps around the abdominal region to the navel  Plus, he noted he has muscle spasms in the lumbar and thoracic spine regions on the left side  Patient denies left upper or lower extremity radicular symptoms  Patient noted denies left upper and lower extremity weakness  Patient noted he does have a history of lumbar spine bouts in his past, but he noted this episode is different  Patient noted he started with a medrol dose pack yesterday evening  Patient noted the following deficits due to left thoracic and lumbar spine pain: sleep, sitting, standing, bending, lifting, squatting, walking based functional activities  Patient noted movement will aggravate left sided lumbar and thoracic spine pain    Patient noted use of inversion table does provide short term  Pain relief, as well as standing in a flexed lumbar spine posture with bilateral upper extremities on his thigh musculature pushing upward providing a distraction type effect  Patient noted he is an , which is limited despite changing his posture from sitting to standing via sit to stand desktop  Patient noted lying in bed remains limited due to pain aggravation with yesterday he had was able to lie on his left side to obtain some sleep  Patient noted typically all positions are limited by left lumbar and thoracic spine pain presentation  Patient noted rare, but current intermittent challenge of feeling to urinate but today he has been able to urinate normally  Patient noted his PCP recommended he watch this status and if worsens to call PCP office or go to ED  Impairments: abnormal or restricted ROM, activity intolerance, impaired physical strength, lacks appropriate home exercise program and pain with function  Understanding of Dx/Px/POC: excellent   Prognosis: good  Prognosis details: Patient is a 38y o  year old male seen for outpatient PT evaluation with pain, mobility and functional deficits due to left sided lumbar and thoracic spine pain  Patient presents to PT IE with the following problems, concerns, deficits and impairments: left thoracic and lumbar spine pain, decreased lumbar and thoracic spine region range of motion, decreased bilateral lower extremity mobility, + TTP, + muscle guarding, gait deficits, functional limitations and decreased tolerance to activity  Patient would benefit from skilled PT services under the following PT treatment plan to address the above noted deficits: therapeutic exercises and activities to facilitate lumbar and thoracic spine mobility and bilateral lower extremity mobility, gait training, postural reeducation and strengthening, DLS and abdominal strengthening, IASTM techniques, Kinesio taping techniques, traction, modalities, manual therapy techniques and a hep  Thank you for the referral      Goals  Short Term goals - 4 weeks  1  Patient will be independent HEP     2   Patient will report a 25 - 50% decrease in pain complaints  3   Increase strength 1/2 grade  4   Increase ROM 5-10 degrees  Long Term goals - 8 weeks  1  Patient will report elimination of pain complaints  2   Patient will return to all work related activities without restriction  3   Patient will return to all recreational activities without restriction  4   ROM WFL  5   Strength 5/5   6   Patient will report ability to sleep without lumbar or thoracic spine symptoms or limitations  7   Patient will report ability to resume sitting work activities without lumbar or thoracic spine symptoms or limitations  8   Patient will report ability to resume standing work and house hold activities without lumbar or thoracic spine symptoms or limitations  9   Patient will report ability to resume house hold functional activities involving bending, lifting and squatting without lumbar or thoracic spine symptoms or limitations  10   Patient will report ability to resume walking based functional activities are without lumbar or thoracic spine symptoms or limitations  Plan  Patient would benefit from: skilled physical therapy  Planned modality interventions: cryotherapy, TENS, thermotherapy: hydrocollator packs, traction, ultrasound and unattended electrical stimulation  Planned therapy interventions: joint mobilization, manual therapy, massage, balance, aquatic therapy, balance/weight bearing training, body mechanics training, patient education, postural training, compression, self care, neuromuscular re-education, strengthening, stretching, therapeutic activities, therapeutic training, transfer training, therapeutic exercise, flexibility, functional ROM exercises, gait training, graded activity and graded exercise  Frequency: 2x week  Duration in weeks: 8  Treatment plan discussed with: patient        Subjective Evaluation    History of Present Illness  Mechanism of injury: Patient's PMHx is remarkable for Basal Cell Carcinoma      CT ABDOMEN AND PELVIS WITH IV CONTRAST     INDICATION:   LLQ abdominal pain  left flank pain      COMPARISON: CT abdomen and pelvis 6/22/2018     TECHNIQUE:  CT examination of the abdomen and pelvis was performed  Multiplanar 2D reformatted images were created from the source data      Radiation dose length product (DLP) for this visit:  510 75 mGy-cm   This examination, like all CT scans performed in the [facility], was performed utilizing techniques to minimize radiation dose exposure, including the use of iterative   reconstruction and automated exposure control      IV Contrast:  100 mL of iohexol (OMNIPAQUE) 350  Enteric Contrast:  Enteric contrast was not administered      FINDINGS:     ABDOMEN     LOWER CHEST:  No clinically significant abnormality identified in the visualized lower chest      LIVER/BILIARY TREE: One or more subcentimeter sharply circumscribed low-density hepatic lesion(s) are noted, too small to accurately characterize, but statistically most likely to represent subcentimeter hepatic cysts  No suspicious solid hepatic lesion   is identified  Hepatic contours are normal   No biliary dilatation      GALLBLADDER:  No calcified gallstones  No pericholecystic inflammatory change      SPLEEN:  Unremarkable      PANCREAS:  Unremarkable      ADRENAL GLANDS:  Unremarkable      KIDNEYS/URETERS:  Unremarkable  No hydronephrosis      STOMACH AND BOWEL:  Unremarkable      APPENDIX: A normal appendix was visualized      ABDOMINOPELVIC CAVITY:  No ascites  No pneumoperitoneum  No lymphadenopathy      VESSELS:  Unremarkable for patient's age      PELVIS     REPRODUCTIVE ORGANS:  Unremarkable for patient's age      URINARY BLADDER:  Unremarkable      ABDOMINAL WALL/INGUINAL REGIONS: Tiny fat-containing umbilical hernia      OSSEOUS STRUCTURES: No acute fracture or osseous destructive lesion identified  Degenerative changes of the spine and pubic symphysis  Progressive degenerative changes at L4-5   Chronic bilateral pars defect at L4 with stable grade 1 anterolisthesis of   L4 on L5  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 with L5 designated immediately above the last rudimentary intervertebral disc  There are hyperplastic bilateral transverse processes of L5 with pseudoarthrosis with the   underlying bilateral sacral ala      IMPRESSION:     No evidence of acute abdominopelvic process      Chronic bilateral pars defect at L4 with stable grade 1 anterolisthesis of L4 on L5  Degenerative changes at L4-5 however have progressed since 2018      Pain  At best pain ratin  At worst pain ratin  Location: lumbar and thoracic spine regions    Patient Goals  Patient goals for therapy: decreased pain, increased motion, increased strength, independence with ADLs/IADLs and return to sport/leisure activities          Objective     Tenderness     Additional Tenderness Details  Patient is + for muscle guarding at severe levels and + TTP at moderate levels at left lumbar and thoracic spine paraspinal musculature  Active Range of Motion     Lumbar   Flexion: 94 degrees   Extension: 28 degrees  with pain  Left lateral flexion: 26 degrees    with pain  Right lateral flexion: 25 degrees  with pain  Left rotation: 60 degrees   Right rotation: 54 degrees  with pain  Left Hip   Flexion: 122 degrees   Abduction: 40 degrees     Right Hip   Flexion: 120 degrees   Abduction: 40 degrees     Additional Active Range of Motion Details  Hamstring mobility on right at 80 degrees and left at 70 degrees    Mechanical Assessment    Cervical      Thoracic      Lumbar    Lying flexion: repeated movements  Pain intensity: better  Pain level: decreased  Sitting flexion: repeated movements  Pain intensity: better  Pain level: decreased  Standing extension: repeated movements  Pain intensity: worse  Pain level: increased  Lying extension: repeated movements  Pain intensity: worse  Pain level: increased    Strength/Myotome Testing Left Hip   Planes of Motion   Flexion: 5  Extension: 5  Abduction: 5  Adduction: 5    Right Hip   Planes of Motion   Flexion: 5  Extension: 5  Abduction: 5  Adduction: 5    Left Knee   Flexion: 5  Extension: 5    Right Knee   Flexion: 5  Extension: 5    Left Ankle/Foot   Dorsiflexion: 5  Plantar flexion: 5    Right Ankle/Foot   Dorsiflexion: 5  Plantar flexion: 5    Tests     Lumbar     Left   Positive slump test    Negative passive SLR  Right   Negative crossed SLR, passive SLR and slump test      Left Pelvic Girdle/Sacrum   Positive: active SLR test      Left Hip   Positive ABE and piriformis  SLR: Positive  Right Hip   Positive ABE  Negative piriformis  SLR: Negative  Ambulation     Ambulation: Level Surfaces     Additional Level Surfaces Ambulation Details  Patient ambulates with decrease in pace, decrease in lumbar spine rotation and decrease in bilateral upper extremity swing phase  Precautions: Patient's PMHx is remarkable for Basal Cell Carcinoma        Manuals 5/9/23            Kinesio taping to bilateral lumbar spine to thoracic spine erector spinae musculature at % tension                                                    Neuro Re-Ed                                                                                                        Ther Ex                          Recumbent bike             Repeated lumbar spine flexion seated 5 x hep            Seated SKTC:L                          LAQ:B:             Seated hip flexion:B:                          Hamstring stretch:B:             LTR:B:             Piriformis stretch:B:             Supine SKTC:B: 3 sec x 10 Left hep            DLS abdominal bracing in hook lying             DLS abdominal bracing in hook lying with hip flexion:B:             DLS abdominal bracing in hook lying with slr flexion:B:             Supine DLS opposite ue to le:B:             Bridges                          Prone lying with one pillow under abdominal region                          Quadruped heel sit stretch                          Mini squats with DLS             Lunges with DLS:B:             [place] rows and extension with DLS:B:             [employer]                          Ther Activity                                       Gait Training                                       Modalities             CP to lumbar and thoracic spine sitting                          mechanical Lumbar traction in supine

## 2023-05-11 ENCOUNTER — TELEPHONE (OUTPATIENT)
Dept: OTHER | Facility: OTHER | Age: 43
End: 2023-05-11

## 2023-05-11 NOTE — TELEPHONE ENCOUNTER
Patient called saying that he is returning a phone call that he had got from the office regarding going over his test result, patient is asking if the office can give him back a call regarding the matter

## 2023-05-12 DIAGNOSIS — F40.240 CLAUSTROPHOBIA: Primary | ICD-10-CM

## 2023-05-12 RX ORDER — ALPRAZOLAM 0.25 MG/1
0.25 TABLET ORAL 3 TIMES DAILY PRN
Qty: 3 TABLET | Refills: 0 | Status: SHIPPED | OUTPATIENT
Start: 2023-05-12

## 2023-05-17 ENCOUNTER — TELEPHONE (OUTPATIENT)
Dept: PHYSICAL THERAPY | Facility: OTHER | Age: 43
End: 2023-05-17

## 2023-05-17 NOTE — TELEPHONE ENCOUNTER
Nurse placed call as a follow-up on recent triage completed with  [facility]    Patient answered where nurse reason for call was reviewed  Patient did follow through with PT evaluation and subsequent sessions need to be scheduled  Patient voiced a positive experience and overall satisfaction with the program  His one concern is obtaining a second MRI and he is working with his PCP to assist     Patient appreciative of f/u call and ability to provide feedback  Nurse wished him well and the chart would be noted

## 2023-05-19 DIAGNOSIS — F41.9 ANXIETY: ICD-10-CM

## 2023-05-19 RX ORDER — ESCITALOPRAM OXALATE 10 MG/1
10 TABLET ORAL DAILY
Qty: 90 TABLET | Refills: 3 | Status: SHIPPED | OUTPATIENT
Start: 2023-05-19

## 2023-05-25 PROBLEM — M54.6 LEFT-SIDED THORACIC BACK PAIN: Status: ACTIVE | Noted: 2023-05-25

## 2023-05-25 NOTE — ASSESSMENT & PLAN NOTE
New evaluation of left sided thoracic pain  · Started 5/1/23  Left sided spasms, sharp pain to flank, numbness wrapping from flank to umbilicus  No BBI  Also has LBP with known anterolisthesis  · Incomplete MRI d/t anxiety and pain  · Exam: diminished LT and pinprick to left abdomen, 5/5 throughout, 2+ DTRs, no clonus    Imaging:  · MRI thoracic spine 5/9/23: Study severely limited with sagittal only imaging obtained  The patient was unable to tolerate complete imaging secondary to claustrophobia and severe pain  Suspected moderate to severe left foraminal narrowing at T11-12 potentially impacting left T11 nerve root  The patient should return for complete imaging of the thoracic spine  Plan:  · Briefly discussed imaging with patient  Unfortunately MRI unable to be completed due to severe pain and anxiety  Possible foraminal stenosis noted at T11-12  · Discussed reordering of MRI to be completed with anxiolytic medication which he already has  · Continue PT   · He already has an appointment with pain management next week, we will keep this as well for evaluation for possible injections  · Also recommend baseline thoracic spine x-ray as well as updated lumbar spine x-ray including flexion and extension given his known spondylolisthesis and ongoing back pain  States that he would like his thoracic pain dealt with first, then we can focus on his low back issues if necessary  · Reviewed red flag signs and symptoms  · Follow-up once x-rays and MRI completed as joint appointment with spine surgeon  · Call sooner with any questions or concerns

## 2023-05-26 ENCOUNTER — TELEPHONE (OUTPATIENT)
Dept: INTERNAL MEDICINE CLINIC | Facility: CLINIC | Age: 43
End: 2023-05-26

## 2023-05-26 ENCOUNTER — CONSULT (OUTPATIENT)
Dept: NEUROSURGERY | Facility: CLINIC | Age: 43
End: 2023-05-26

## 2023-05-26 VITALS
HEART RATE: 66 BPM | TEMPERATURE: 97.9 F | DIASTOLIC BLOOD PRESSURE: 84 MMHG | OXYGEN SATURATION: 97 % | HEIGHT: 69 IN | SYSTOLIC BLOOD PRESSURE: 122 MMHG | WEIGHT: 209.1 LBS | BODY MASS INDEX: 30.97 KG/M2

## 2023-05-26 DIAGNOSIS — M43.10 PARS DEFECT WITH SPONDYLOLISTHESIS: ICD-10-CM

## 2023-05-26 DIAGNOSIS — G54.9 NERVE ROOT COMPRESSION: Primary | ICD-10-CM

## 2023-05-26 DIAGNOSIS — M43.00 PARS DEFECT WITH SPONDYLOLISTHESIS: ICD-10-CM

## 2023-05-26 NOTE — PROGRESS NOTES
Neurosurgery Office Note  Aureliano Moreira 38 y o  male MRN: [id number]      Assessment/Plan     Left-sided thoracic back pain  New evaluation of left sided thoracic pain  · Started 5/1/23  Left sided spasms, sharp pain to flank, numbness wrapping from flank to umbilicus  No BBI  Also has LBP with known anterolisthesis  · Incomplete MRI d/t anxiety and pain  · Exam: diminished LT and pinprick to left abdomen, 5/5 throughout, 2+ DTRs, no clonus    Imaging:  · MRI thoracic spine 5/9/23: Study severely limited with sagittal only imaging obtained  The patient was unable to tolerate complete imaging secondary to claustrophobia and severe pain  Suspected moderate to severe left foraminal narrowing at T11-12 potentially impacting left T11 nerve root  The patient should return for complete imaging of the thoracic spine  Plan:  · Briefly discussed imaging with patient  Unfortunately MRI unable to be completed due to severe pain and anxiety  Possible foraminal stenosis noted at T11-12  · Discussed reordering of MRI to be completed with anxiolytic medication which he already has  · Continue PT   · He already has an appointment with pain management next week, we will keep this as well for evaluation for possible injections  · Also recommend baseline thoracic spine x-ray as well as updated lumbar spine x-ray including flexion and extension given his known spondylolisthesis and ongoing back pain  States that he would like his thoracic pain dealt with first, then we can focus on his low back issues if necessary  · Reviewed red flag signs and symptoms  · Follow-up once x-rays and MRI completed as joint appointment with spine surgeon  · Call sooner with any questions or concerns           Diagnoses and all orders for this visit:    Nerve root compression  -     Ambulatory Referral to Neurosurgery  -     MRI thoracic spine wo contrast; Future  -     XR spine thoracic 3 vw; Future    Pars defect with spondylolisthesis  -     XR spine lumbar complete w bending minimum 6 views; Future          I have spent a total time of 50 minutes on 05/26/23 in caring for this patient including Diagnostic results, Instructions for management, Patient and family education, Impressions, Counseling / Coordination of care, Documenting in the medical record, Reviewing / ordering tests, medicine, procedures   and Obtaining or reviewing history    CHIEF COMPLAINT    Chief Complaint   Patient presents with   • Consult       HISTORY    History of Present Illness     38y o  year old male     38year old gentleman seen for new evaluation of mid back pain  He has been having an issue with left-sided thoracic spine pain since May 1, 2023  States he is always had some issues with his back is a   He started having left-sided mid back spasms and dull pain that became tipped over the edge on 5/1/2023  He developed sharp pains as well as pains radiating to the flank with numbness in the same distribution towards the umbilicus  The severe pain he was having has gotten a bit better with prednisone  Currently describing 3-4/10 pain  The spasms he feels are better but there is still an intermittent sharp pain  This is worse with a twisting motion and rotation, not with flexion  He is still been very active, including playing baseball until recently  Denies issues with bowel or bladder incontinence, no falls, balance is okay  States that he can only walk a couple minutes before the pain in his thoracic spine is too bad, then he has to stretch and lean over before he can keep walking again  He has started PT about 3 weeks ago  He has not seen pain management for injections yet, has an appointment next week  Patient also complains of low back pain, radiating towards the left buttock  It does not go down his leg  Sometimes it wraps into the groin on the left, like a soreness    Describes his back pain as a 1-2/10, like a pulled muscle  See Discussion    REVIEW OF SYSTEMS    Review of Systems   Constitutional: Negative  HENT: Negative  Eyes: Negative  Respiratory: Negative  Cardiovascular: Negative  Gastrointestinal: Negative  Endocrine: Negative  Genitourinary: Negative  Musculoskeletal: Positive for back pain (Mid to lower back pain radiate down to left buttus  duall constant ocasional middle left sharp back pain ), gait problem (pain cause to moving slower and need to stop walking ) and myalgias (cause muscle spasm mid to lower left side of the back wrap around the left area  )  Neurological: Positive for numbness (mid left back area wrap around to bellly constant  )  Hematological: Negative  Psychiatric/Behavioral: Positive for sleep disturbance (interup do to p(ain)  ROS obtained by MA  Reviewed  See HPI  Meds/Allergies     Current Outpatient Medications   Medication Sig Dispense Refill   • escitalopram (LEXAPRO) 10 mg tablet Take 1 tablet (10 mg total) by mouth daily 90 tablet 3   • ALPRAZolam (XANAX) 0 25 mg tablet Take 1 tablet (0 25 mg total) by mouth 3 (three) times a day as needed for anxiety (Patient not taking: Reported on 5/26/2023) 3 tablet 0     No current facility-administered medications for this visit  Allergies   Allergen Reactions   • Pollen Extract Allergic Rhinitis       PAST HISTORY    Past Medical History:   Diagnosis Date   • Basal cell carcinoma (BCC) of scalp or skin of neck    • Cancer ([facility] [address] )    • DDD (degenerative disc disease), lumbar 7/30/2018   • Left-sided thoracic back pain 5/25/2023       Past Surgical History:   Procedure Laterality Date   • NO PAST SURGERIES         Social History     Tobacco Use   • Smoking status: Never   • Smokeless tobacco: Never   Vaping Use   • Vaping Use: Never used   Substance Use Topics   • Alcohol use:  Yes     Alcohol/week: 1 0 standard drink of alcohol     Types: 1 Cans of beer per week   • Drug use: No       Family "History   Problem Relation Age of Onset   • Coronary artery disease Father    • Diabetes Father          Above history personally reviewed  EXAM    Vitals:Blood pressure 122/84, pulse 66, temperature 97 9 °F (36 6 °C), height 5' 9\" (1 753 m), weight 94 8 kg (209 lb 1 6 oz), SpO2 97 %  ,Body mass index is 30 88 kg/m²  Physical Exam  Vitals reviewed  Constitutional:       General: He is awake  Appearance: Normal appearance  HENT:      Head: Normocephalic and atraumatic  Eyes:      Conjunctiva/sclera: Conjunctivae normal    Cardiovascular:      Rate and Rhythm: Normal rate  Pulmonary:      Effort: Pulmonary effort is normal    Musculoskeletal:         General: Tenderness present  Comments: Left sided paraspinal muscle TTP at around T10/T11  Midline low back TTP  Pain with ROM   Skin:     General: Skin is warm and dry  Neurological:      Mental Status: He is alert and oriented to person, place, and time  Coordination: Heel to Shin Test normal       Gait: Gait is intact  Deep Tendon Reflexes:      Reflex Scores:       Bicep reflexes are 2+ on the right side and 2+ on the left side  Brachioradialis reflexes are 2+ on the right side and 2+ on the left side  Patellar reflexes are 2+ on the right side and 2+ on the left side  Achilles reflexes are 2+ on the right side and 2+ on the left side  Psychiatric:         Attention and Perception: Attention and perception normal          Mood and Affect: Mood and affect normal          Speech: Speech normal          Behavior: Behavior normal  Behavior is cooperative  Thought Content: Thought content normal          Cognition and Memory: Cognition and memory normal          Judgment: Judgment normal          Neurologic Exam     Mental Status   Oriented to person, place, and time  Follows 2 step commands     Attention: normal  Concentration: normal    Speech: speech is normal   Level of consciousness: alert  Knowledge: " good    Normal comprehension  Motor Exam   Muscle bulk: normal  Overall muscle tone: normal  BUE - 5/5  BLE - 5/5     Sensory Exam   Right arm light touch: normal  Left arm light touch: normal  Right leg light touch: normal  Left leg light touch: normal  Diminished LT and pinprick to left abdomen     Gait, Coordination, and Reflexes     Gait  Gait: normal    Coordination   Heel to shin coordination: normal    Tremor   Resting tremor: absent  Intention tremor: absent  Action tremor: absent    Reflexes   Right brachioradialis: 2+  Left brachioradialis: 2+  Right biceps: 2+  Left biceps: 2+  Right patellar: 2+  Left patellar: 2+  Right achilles: 2+  Left achilles: 2+  Right Lucia: absent  Left Lucia: absent  Right ankle clonus: absent  Left ankle clonus: absent        MEDICAL DECISION MAKING    Imaging Studies:     MRI thoracic spine wo contrast    Result Date: 5/9/2023  Narrative: MRI THORACIC SPINE WITHOUT CONTRAST INDICATION: R20 0: Anesthesia of skin R20 2: Paresthesia of skin R10 9: Unspecified abdominal pain  COMPARISON:  None  TECHNIQUE:  Multiplanar, multisequence imaging of the thoracic spine was performed    IMAGE QUALITY: Study limited with sagittal T1 and T2 sequences only  FINDINGS: ALIGNMENT: Mild chronic wedging compression deformity of T8  Slight exaggeration of the thoracic kyphosis at T7-8 noted  MARROW SIGNAL:  Normal marrow signal is identified within the visualized bony structures  No discrete marrow lesion  THORACIC CORD: No cord signal abnormality within the limits of this study  PARAVERTEBRAL SOFT TISSUES:  Normal  THORACIC DEGENERATIVE CHANGE: Suspected left foraminal stenosis at T11-12 based on sagittal only imaging  The patient should return for complete imaging of the thoracic spine  OTHER FINDINGS:  None  Impression: Study severely limited with sagittal only imaging obtained  The patient was unable to tolerate complete imaging secondary to claustrophobia and severe pain  Suspected moderate to severe left foraminal narrowing at T11-12 potentially impacting left T11 nerve root  The patient should return for complete imaging of the thoracic spine  Workstation performed: [id number]     CT abdomen pelvis with contrast    Result Date: 5/1/2023  Narrative: CT ABDOMEN AND PELVIS WITH IV CONTRAST INDICATION:   LLQ abdominal pain left flank pain  COMPARISON: CT abdomen and pelvis 6/22/2018 TECHNIQUE:  CT examination of the abdomen and pelvis was performed  Multiplanar 2D reformatted images were created from the source data  Radiation dose length product (DLP) for this visit:  510 75 mGy-cm   This examination, like all CT scans performed in the [facility], was performed utilizing techniques to minimize radiation dose exposure, including the use of iterative  reconstruction and automated exposure control  IV Contrast:  100 mL of iohexol (OMNIPAQUE) 350 Enteric Contrast:  Enteric contrast was not administered  FINDINGS: ABDOMEN LOWER CHEST:  No clinically significant abnormality identified in the visualized lower chest  LIVER/BILIARY TREE: One or more subcentimeter sharply circumscribed low-density hepatic lesion(s) are noted, too small to accurately characterize, but statistically most likely to represent subcentimeter hepatic cysts  No suspicious solid hepatic lesion  is identified  Hepatic contours are normal   No biliary dilatation  GALLBLADDER:  No calcified gallstones  No pericholecystic inflammatory change  SPLEEN:  Unremarkable  PANCREAS:  Unremarkable  ADRENAL GLANDS:  Unremarkable  KIDNEYS/URETERS:  Unremarkable  No hydronephrosis  STOMACH AND BOWEL:  Unremarkable  APPENDIX: A normal appendix was visualized  ABDOMINOPELVIC CAVITY:  No ascites  No pneumoperitoneum  No lymphadenopathy  VESSELS:  Unremarkable for patient's age  PELVIS REPRODUCTIVE ORGANS:  Unremarkable for patient's age  URINARY BLADDER:  Unremarkable   ABDOMINAL WALL/INGUINAL REGIONS: Tiny fat-containing umbilical hernia  OSSEOUS STRUCTURES: No acute fracture or osseous destructive lesion identified  Degenerative changes of the spine and pubic symphysis  Progressive degenerative changes at L4-5  Chronic bilateral pars defect at L4 with stable grade 1 anterolisthesis of L4 on L5  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

## 2023-05-26 NOTE — TELEPHONE ENCOUNTER
Tameka from [address] [place] [address] Neurosurgery called asking the status of prior authorization for Mri  Could you please check the status and call Tameka back    Thank You

## 2023-05-31 ENCOUNTER — CONSULT (OUTPATIENT)
Dept: PAIN MEDICINE | Facility: CLINIC | Age: 43
End: 2023-05-31

## 2023-05-31 VITALS
DIASTOLIC BLOOD PRESSURE: 86 MMHG | HEART RATE: 73 BPM | SYSTOLIC BLOOD PRESSURE: 124 MMHG | BODY MASS INDEX: 30.96 KG/M2 | HEIGHT: 69 IN | WEIGHT: 209 LBS

## 2023-05-31 DIAGNOSIS — M54.14 THORACIC RADICULOPATHY: ICD-10-CM

## 2023-05-31 DIAGNOSIS — M54.6 LEFT-SIDED THORACIC BACK PAIN, UNSPECIFIED CHRONICITY: Primary | ICD-10-CM

## 2023-05-31 DIAGNOSIS — M47.816 LUMBAR SPONDYLOSIS: ICD-10-CM

## 2023-05-31 DIAGNOSIS — M43.06 LUMBAR SPONDYLOLYSIS: ICD-10-CM

## 2023-05-31 DIAGNOSIS — M54.16 LUMBAR RADICULOPATHY: ICD-10-CM

## 2023-05-31 DIAGNOSIS — M43.16 SPONDYLOLISTHESIS OF LUMBAR REGION: ICD-10-CM

## 2023-05-31 NOTE — PROGRESS NOTES
Assessment  1  Left-sided thoracic back pain, unspecified chronicity    2  Spondylolisthesis of lumbar region    3  Lumbar spondylosis    4  Lumbar spondylolysis    5  Lumbar radiculopathy    6  Thoracic radiculopathy        Plan  73-year-old male, self-referred, presenting for initial notation regarding 1 5-month history of lower thoracic and lower lumbar back pain that radiates into the left-sided abdomen with associated numbness  He also is experiencing some numbness and dysesthesias into the left anterior quadricep for the past day or 2 which is mild  He denies any trauma or inciting event  Patient did have an MRI of the thoracic spine demonstrating chronic compression deformity at T8  Suspected left foraminal stenosis at T11-12, however imaging was severely limited as the patient was unable to tolerate positioning  He is in the process of getting repeat MRI approved by insurance  MRI of the lumbar spine was from 2018 demonstrating bilateral pars defects at L4 with grade 2 spondylolisthesis  Severe right and moderate to severe left foraminal stenosis at this level  Facet arthrosis noted from L2-3 to L4-5  Patient has been doing physical therapy and home exercise program   He is doing his home exercise program as taught to him by physical therapy 30 to 45 minutes daily every day since May 9, 2023  This does provide some transient relief  He did find significant relief with a course of oral steroids  Tylenol and OTC NSAIDs provide minimal relief  The patient symptoms are consistent with left thoracic radiculopathy  He also appears to have a left L4 radiculopathy likely stemming from stenosis at L4-5  1   Patient will proceed with MRI of the thoracic spine with anxiolytic on board and he will contact office once MRI is completed  2  The patient's left lower extremity radicular symptoms worsen or persist, may consider updated MRI of the lumbar spine without contrast  3    Patient will continue with his home exercise program  4  I will follow-up with the patient in 4 to 6 weeks      My impressions and treatment recommendations were discussed in detail with the patient who verbalized understanding and had no further questions  Discharge instructions were provided  I personally saw and examined the patient and I agree with the above discussed plan of care  No orders of the defined types were placed in this encounter  No orders of the defined types were placed in this encounter  History of Present Illness    Sailaja Pearce is a 38 y o  male self-referred, presenting for initial notation regarding 1 5-month history of lower thoracic and lower lumbar back pain that radiates into the left-sided abdomen with associated numbness  He also is experiencing some numbness and dysesthesias into the left anterior quadricep for the past day or 2 which is mild  He denies any trauma or inciting event  He denies any bladder or bowel incontinence or saddle anesthesia  He denies any lower extremity weakness  Patient did have an MRI of the thoracic spine demonstrating chronic compression deformity at T8  Suspected left foraminal stenosis at T11-12, however imaging was severely limited as the patient was unable to tolerate positioning  He is in the process of getting repeat MRI approved by insurance  MRI of the lumbar spine was from 2018 demonstrating bilateral pars defects at L4 with grade 2 spondylolisthesis  Severe right and moderate to severe left foraminal stenosis at this level  Facet arthrosis noted from L2-3 to L4-5  Patient has been doing physical therapy and home exercise program   He is doing his home exercise program as taught to him by physical therapy 30 to 45 minutes daily every day since May 9, 2023  This does provide some transient relief  He did find significant relief with a course of oral steroids  Tylenol and OTC NSAIDs provide minimal relief    The patient rates his pain a 3/10 and the pain does not follow any particular pattern throughout the day  The pain is described as numbness, sharp, dull, and aching  The pain is increased with bending and exercise  The pain is alleviated with oral steroids and relaxation  Other than as stated above, the patient denies any interval changes in medications, medical condition, mental condition, symptoms, or allergies since the last office visit  I have personally reviewed and/or updated the patient's past medical history, past surgical history, family history, social history, current medications, allergies, and vital signs today  Review of Systems   Constitutional: Negative for fever and unexpected weight change  HENT: Negative for trouble swallowing  Eyes: Negative for visual disturbance  Respiratory: Negative for shortness of breath and wheezing  Cardiovascular: Negative for chest pain and palpitations  Gastrointestinal: Negative for constipation, diarrhea, nausea and vomiting  Endocrine: Negative for cold intolerance, heat intolerance and polydipsia  Genitourinary: Negative for difficulty urinating and frequency  Musculoskeletal: Positive for arthralgias and myalgias  Negative for gait problem and joint swelling  Skin: Negative for rash  Neurological: Negative for dizziness, seizures, syncope, weakness and headaches  Hematological: Does not bruise/bleed easily  Psychiatric/Behavioral: Positive for decreased concentration  Negative for dysphoric mood  All other systems reviewed and are negative        Patient Active Problem List   Diagnosis   • Abdominal pain, left lower quadrant   • Acute left flank pain   • DDD (degenerative disc disease), lumbar   • Pars defect with spondylolisthesis   • Foraminal stenosis of lumbar region   • Anxiety   • Encounter for wellness examination   • Pure hypercholesterolemia   • Left-sided thoracic back pain       Past Medical History:   Diagnosis Date   • Basal cell carcinoma (BCC) of "scalp or skin of neck    • Cancer (HCC)    • DDD (degenerative disc disease), lumbar 7/30/2018   • Left-sided thoracic back pain 5/25/2023       Past Surgical History:   Procedure Laterality Date   • NO PAST SURGERIES         Family History   Problem Relation Age of Onset   • Coronary artery disease Father    • Diabetes Father        Social History     Occupational History   • Not on file   Tobacco Use   • Smoking status: Never   • Smokeless tobacco: Never   Vaping Use   • Vaping Use: Never used   Substance and Sexual Activity   • Alcohol use: Yes     Alcohol/week: 1 0 standard drink of alcohol     Types: 1 Cans of beer per week   • Drug use: No   • Sexual activity: Yes     Partners: Female       Current Outpatient Medications on File Prior to Visit   Medication Sig   • ALPRAZolam (XANAX) 0 25 mg tablet Take 1 tablet (0 25 mg total) by mouth 3 (three) times a day as needed for anxiety (Patient not taking: Reported on 5/26/2023)   • escitalopram (LEXAPRO) 10 mg tablet Take 1 tablet (10 mg total) by mouth daily     No current facility-administered medications on file prior to visit  Allergies   Allergen Reactions   • Pollen Extract Allergic Rhinitis       Physical Exam    /86   Pulse 73   Ht 5' 9\" (1 753 m)   Wt 94 8 kg (209 lb)   BMI 30 86 kg/m²     Constitutional: normal, well developed, well nourished, alert, in no distress and non-toxic and no overt pain behavior  Eyes: anicteric  HEENT: grossly intact  Neck: supple, symmetric, trachea midline and no masses   Pulmonary:even and unlabored  Cardiovascular:No edema or pitting edema present  Skin:Normal without rashes or lesions and well hydrated  Psychiatric:Mood and affect appropriate  Neurologic:Cranial Nerves II-XII grossly intact  Musculoskeletal:normal gait  Left lower thoracic and lumbar paraspinal musculature tender to palpation and ropey in texture    Bilateral patellar and Achilles reflexes were 2 out of 4 and symmetrical   No clonus is " noted bilaterally  Bilateral lower extremity strength 5 out of 5 in all muscular's  Sensation intact to light touch in L3-S1 dermatomes bilaterally  Positive straight leg raise on the left and negative on the right  Negative Ike's test bilaterally  Decreased sensation to light touch in the left lumbar and flank region  Imaging    Study Result    Narrative & Impression   MRI THORACIC SPINE WITHOUT CONTRAST     INDICATION: R20 0: Anesthesia of skin  R20 2: Paresthesia of skin  R10 9: Unspecified abdominal pain      COMPARISON:  None      TECHNIQUE:  Multiplanar, multisequence imaging of the thoracic spine was performed        IMAGE QUALITY: Study limited with sagittal T1 and T2 sequences only      FINDINGS:     ALIGNMENT: Mild chronic wedging compression deformity of T8  Slight exaggeration of the thoracic kyphosis at T7-8 noted      MARROW SIGNAL:  Normal marrow signal is identified within the visualized bony structures  No discrete marrow lesion      THORACIC CORD: No cord signal abnormality within the limits of this study      PARAVERTEBRAL SOFT TISSUES:  Normal      THORACIC DEGENERATIVE CHANGE: Suspected left foraminal stenosis at T11-12 based on sagittal only imaging  The patient should return for complete imaging of the thoracic spine      OTHER FINDINGS:  None      IMPRESSION:     Study severely limited with sagittal only imaging obtained  The patient was unable to tolerate complete imaging secondary to claustrophobia and severe pain      Suspected moderate to severe left foraminal narrowing at T11-12 potentially impacting left T11 nerve root   The patient should return for complete imaging of the thoracic spine            Workstation performed: [id number]     PACS Images     Show images for MRI lumbar spine wo contrast  Study Result    Narrative & Impression   MRI LUMBAR SPINE WITHOUT CONTRAST     INDICATION: Dull low back pain with occasional left leg pain      COMPARISON:  None      TECHNIQUE: Sagittal T1, sagittal T2, sagittal inversion recovery, axial T1 and axial T2, coronal T2        IMAGE QUALITY:  Diagnostic     FINDINGS:     ALIGNMENT:  Bilateral L4 pars defects result in grade 2 spondylolisthesis      MARROW SIGNAL:  Normal marrow signal is identified within the visualized bony structures  No discrete marrow lesion      DISTAL CORD AND CONUS:  Normal size and signal within the distal cord and conus  The conus ends at the L1 level      PARASPINAL SOFT TISSUES:  Paraspinal soft tissues are unremarkable      SACRUM:  Normal signal within the sacrum  No evidence of insufficiency or stress fracture      LOWER THORACIC DISC SPACES:  Normal disc height and signal   No disc herniation, canal stenosis or foraminal narrowing      LUMBAR DISC SPACES:     L1-L2:  Normal      L2-L3:  No significant central or foraminal narrowing  Mild facet hypertrophy      L3-L4:  Moderate facet hypertrophy identified with mild annular bulging  Small marginal osteophytes noted  No significant central or foraminal narrowing      L4-L5:  Bilateral L4 pars defects result in grade 2 spondylolisthesis with uncovering the posterior disc margin  There is severe right and moderate to severe left foraminal narrowing        L5-S1:  No significant central or foraminal narrowing      IMPRESSION:     Bilateral L4 pars defects result in grade 2 spondylolisthesis with uncovering the posterior disc margin    Severe right and moderate to severe left foraminal narrowing identified      Mild degenerative changes elsewhere as described         Workstation performed: [id number]     PACS Images     Show images for XR spine lumbar complete w bending minimum 6 views  Study Result    Narrative & Impression   LUMBAR SPINE     INDICATION:   M54 [id number]: Lumbago with sciatica, left side      COMPARISON:  CT abdomen abdomen and pelvis 6/22/2018     VIEWS:  XR SPINE LUMBAR COMPLETE W BENDING MINIMUM 6 VIEWS  Images: 7     FINDINGS: For the purposes of this study there are tiny riblets seen at the T12 level  There is a transitional L5 lumbar vertebra which appears partially sacralized with pseudoarticulation with the sacrum bilaterally  More definitive numbering of the   spine would require correlation with thoracic spine series  This numbering is in keeping with the previous CT study  There is a disc space at the L5-S1 level      There is no evidence of acute fracture or destructive osseous lesion      Grade I spondylolithesis L4 on L5 secondary to bilateral pars defects at L4  There appears to be 2 mm increase in the anterolisthesis during flexion      There is moderately severe disc space narrowing seen at L4-5      The pedicles appear intact      Left-sided pelvic phleboliths noted      IMPRESSION:     Transitional lumbosacral anatomy as above      Grade I spondylolithesis L4 on L5 secondary to bilateral pars defects at L4   2mm increase in the anterolisthesis during flexion    Moderately advanced disc disease at L4-5         Workstation performed: [id number]

## 2023-06-11 ENCOUNTER — HOSPITAL ENCOUNTER (OUTPATIENT)
Dept: RADIOLOGY | Facility: HOSPITAL | Age: 43
Discharge: HOME | End: 2023-06-11
Payer: COMMERCIAL

## 2023-06-11 DIAGNOSIS — G54.9 NERVE ROOT COMPRESSION: ICD-10-CM

## 2023-06-11 PROCEDURE — 72146 MRI CHEST SPINE W/O DYE: CPT

## 2023-06-11 PROCEDURE — G1004 CDSM NDSC: HCPCS

## 2023-07-12 NOTE — PROGRESS NOTES
Assessment:  1. Thoracic radiculopathy    2. Left-sided thoracic back pain, unspecified chronicity    3. Thoracic degenerative disc disease        Plan:  1. Patient will be scheduled for left T11-12 thoracic epidural steroid injection to address the inflammatory component of the patient's pain. Complete risks and benefits including bleeding, infection, tissue reaction, nerve injury and allergic reaction were discussed. The patient was agreeable and verbalized an understanding  2. May consider updating MRI lumbar spine should the patient's lower extremity symptoms worsen  3. Continue home exercise program  4. Continue to follow with neurosurgery as scheduled  5. Follow-up pending results of procedure or sooner if needed    History of Present Illness: The patient is a 38 y.o. male last seen on 05/31/2023 who presents for a follow up office visit in regards to chronic left-sided lower thoracic back pain that radiates in the left abdominal wall just below his umbilicus. He denies any right-sided symptoms, bowel or bladder incontinence or saddle anesthesia. MRI of the thoracic spine from June 2023 reveals a left T11 disc protrusion which is causing moderate foraminal stenosis. He has done physical therapy and home exercise program with some relief. He has found significant relief in the past with oral steroids but has not found much relief with Tylenol or over-the-counter NSAIDs. Patient also has a secondary complaint of low back pain that intermittently radiates into the left anterior thigh. He states this pain is minimal in comparison to his thoracic pain at this time    The patient rates his pain a 4 out of 10 on the numeric pain rating scale.   He cons he has pain in the morning which is described as dull aching and numbness    I have personally reviewed and/or updated the patient's past medical history, past surgical history, family history, social history, current medications, allergies, and vital signs today. Review of Systems:    Review of Systems   Respiratory: Negative for shortness of breath. Cardiovascular: Negative for chest pain. Gastrointestinal: Negative for constipation, diarrhea, nausea and vomiting. Musculoskeletal: Negative for arthralgias, gait problem, joint swelling and myalgias. Skin: Negative for rash. Neurological: Negative for dizziness, seizures and weakness. All other systems reviewed and are negative. Past Medical History:   Diagnosis Date   • Basal cell carcinoma (BCC) of scalp or skin of neck    • Cancer (HCC)    • DDD (degenerative disc disease), lumbar 7/30/2018   • Left-sided thoracic back pain 5/25/2023       Past Surgical History:   Procedure Laterality Date   • NO PAST SURGERIES         Family History   Problem Relation Age of Onset   • Coronary artery disease Father    • Diabetes Father        Social History     Occupational History   • Not on file   Tobacco Use   • Smoking status: Never   • Smokeless tobacco: Never   Vaping Use   • Vaping Use: Never used   Substance and Sexual Activity   • Alcohol use: Yes     Alcohol/week: 1.0 standard drink of alcohol     Types: 1 Cans of beer per week   • Drug use: No   • Sexual activity: Yes     Partners: Female         Current Outpatient Medications:   •  escitalopram (LEXAPRO) 10 mg tablet, Take 1 tablet (10 mg total) by mouth daily, Disp: 90 tablet, Rfl: 3  •  ALPRAZolam (XANAX) 0.25 mg tablet, Take 1 tablet (0.25 mg total) by mouth 3 (three) times a day as needed for anxiety (Patient not taking: Reported on 5/26/2023), Disp: 3 tablet, Rfl: 0    Allergies   Allergen Reactions   • Pollen Extract Allergic Rhinitis       Physical Exam:    /86   Pulse 58   Ht 5' 9" (1.753 m)   Wt 92.5 kg (204 lb)   BMI 30.13 kg/m²     Constitutional:normal, well developed, well nourished, alert, in no distress and non-toxic and no overt pain behavior.   Eyes:anicteric  HEENT:grossly intact  Neck:supple, symmetric, trachea midline and no masses   Pulmonary:even and unlabored  Cardiovascular:No edema or pitting edema present  Skin:Normal without rashes or lesions and well hydrated  Psychiatric:Mood and affect appropriate  Neurologic:Cranial Nerves II-XII grossly intact  Musculoskeletal:normal gait. Decreased sensation to light touch in the left flank region      Imaging  [place] spine and pain procedure    (Results Pending)     Narrative & Impression   MRI THORACIC SPINE WITHOUT CONTRAST   INDICATION: G54.9: Nerve root and plexus disorder, unspecified. COMPARISON: MRI thoracic spine 5/9/2023   TECHNIQUE: Multiplanar, multisequence imaging of the thoracic spine was performed. .   IMAGE QUALITY: Diagnostic. FINDINGS:   ALIGNMENT: Normal alignment of the thoracic spine. No compression fracture. No subluxation. No scoliosis. MARROW SIGNAL: Normal marrow signal is identified within the visualized bony structures. No discrete marrow lesion. THORACIC CORD: Normal signal within the thoracic cord. PARAVERTEBRAL SOFT TISSUES: Normal.   THORACIC DEGENERATIVE CHANGE: Stable central disc protrusion at the T7-8 level abutting the ventral aspect of the cord without significant central canal narrowing. Stable left foraminal disc protrusion at the T11-12 level with moderate foraminal   narrowing (series 7: 6). IMPRESSION:   1. The thoracic vertebral body heights are maintained demonstrating no acute fracture or subluxation. 2. Stable disc protrusions at the T7-8 and T11-12 levels. Stable moderate left T11-12 neural foraminal narrowing. 3. The thoracic cord appears normal in caliber and signal with no evidence of focal impingement.    Workstation performed: [id number]       Orders Placed This Encounter   Procedures   • [place] spine and pain procedure

## 2023-07-13 ENCOUNTER — OFFICE VISIT (OUTPATIENT)
Dept: PAIN MEDICINE | Facility: CLINIC | Age: 43
End: 2023-07-13
Payer: COMMERCIAL

## 2023-07-13 VITALS
HEIGHT: 69 IN | HEART RATE: 58 BPM | WEIGHT: 204 LBS | BODY MASS INDEX: 30.21 KG/M2 | DIASTOLIC BLOOD PRESSURE: 86 MMHG | SYSTOLIC BLOOD PRESSURE: 119 MMHG

## 2023-07-13 DIAGNOSIS — M54.14 THORACIC RADICULOPATHY: Primary | ICD-10-CM

## 2023-07-13 DIAGNOSIS — M54.6 LEFT-SIDED THORACIC BACK PAIN, UNSPECIFIED CHRONICITY: ICD-10-CM

## 2023-07-13 DIAGNOSIS — M51.34 THORACIC DEGENERATIVE DISC DISEASE: ICD-10-CM

## 2023-07-13 PROCEDURE — 99214 OFFICE O/P EST MOD 30 MIN: CPT | Performed by: NURSE PRACTITIONER

## 2023-07-18 ENCOUNTER — HOSPITAL ENCOUNTER (OUTPATIENT)
Dept: RADIOLOGY | Facility: CLINIC | Age: 43
Discharge: HOME | End: 2023-07-18
Payer: COMMERCIAL

## 2023-07-18 VITALS
SYSTOLIC BLOOD PRESSURE: 155 MMHG | OXYGEN SATURATION: 96 % | DIASTOLIC BLOOD PRESSURE: 96 MMHG | TEMPERATURE: 97.8 F | HEART RATE: 71 BPM | RESPIRATION RATE: 18 BRPM

## 2023-07-18 DIAGNOSIS — M54.14 THORACIC RADICULOPATHY: ICD-10-CM

## 2023-07-18 PROCEDURE — 62321 NJX INTERLAMINAR CRV/THRC: CPT | Performed by: ANESTHESIOLOGY

## 2023-07-18 RX ORDER — PAPAVERINE HCL 150 MG
10 CAPSULE, EXTENDED RELEASE ORAL ONCE
Status: COMPLETED | OUTPATIENT
Start: 2023-07-18 | End: 2023-07-18

## 2023-07-18 RX ADMIN — IOHEXOL 1 ML: 300 INJECTION, SOLUTION INTRAVENOUS at 15:56

## 2023-07-18 RX ADMIN — DEXAMETHASONE SODIUM PHOSPHATE 10 MG: 10 INJECTION, SOLUTION INTRAMUSCULAR; INTRAVENOUS at 16:00

## 2023-07-18 NOTE — DISCHARGE INSTRUCTIONS
Epidural Steroid Injection   WHAT YOU NEED TO KNOW:   An epidural steroid injection (ROSALINE) is a procedure to inject steroid medicine into the epidural space. The epidural space is between your spinal cord and vertebrae. Steroids reduce inflammation and fluid buildup in your spine that may be causing pain. You may be given pain medicine along with the steroids. ACTIVITY  Do not drive or operate machinery today. No strenuous activity today - bending, lifting, etc.  You may resume normal activites starting tomorrow - start slowly and as tolerated. You may shower today, but no tub baths or hot tubs. You may have numbness for several hours from the local anesthetic. Please use caution and common sense, especially with weight-bearing activities. CARE OF THE INJECTION SITE  If you have soreness or pain, apply ice to the area today (20 minutes on/20 minutes off). Starting tomorrow, you may use warm, moist heat or ice if needed. You may have an increase or change in your discomfort for 36-48 hours after your treatment. Apply ice and continue with any pain medication you have been prescribed. Notify [facility] if you have any of the following: redness, drainage, swelling, headache, stiff neck or fever above 100°F.    SPECIAL INSTRUCTIONS  Our office will contact you in approximately 7 days for a progress report. MEDICATIONS  Continue to take all routine medications. Our office may have instructed you to hold some medications. As no general anesthesia was used in today's procedure, you should not experience any side effects related to anesthesia. If you are diabetic, the steroids used in today's injection may temporarily increase your blood sugar levels after the first few days after your injection. Please keep a close eye on your sugars and alert the doctor who manages your diabetes if your sugars are significantly high from your baseline or you are symptomatic.      If you have a problem specifically related to your procedure, please call our office at [phone]. Problems not related to your procedure should be directed to your primary care physician.

## 2023-07-18 NOTE — H&P
History of Present Illness: The patient is a 38 y.o. male who presents with complaints of back and abdominal pain and numbness. Past Medical History:   Diagnosis Date   • Basal cell carcinoma (BCC) of scalp or skin of neck    • Cancer (HCC)    • DDD (degenerative disc disease), lumbar 7/30/2018   • Left-sided thoracic back pain 5/25/2023       Past Surgical History:   Procedure Laterality Date   • NO PAST SURGERIES           Current Outpatient Medications:   •  ALPRAZolam (XANAX) 0.25 mg tablet, Take 1 tablet (0.25 mg total) by mouth 3 (three) times a day as needed for anxiety (Patient not taking: Reported on 5/26/2023), Disp: 3 tablet, Rfl: 0  •  escitalopram (LEXAPRO) 10 mg tablet, Take 1 tablet (10 mg total) by mouth daily, Disp: 90 tablet, Rfl: 3  No current facility-administered medications for this encounter. Allergies   Allergen Reactions   • Pollen Extract Allergic Rhinitis       Physical Exam:   Vitals:    07/18/23 1603   BP: 155/96   Pulse: 71   Resp: 18   Temp:    SpO2: 96%     General: Awake, Alert, Oriented x 3, Mood and affect appropriate  Respiratory: Respirations even and unlabored  Cardiovascular: Peripheral pulses intact; no edema  Musculoskeletal Exam: Left thoracic and upper lumbar paraspinal musculature tender to palpation    ASA Score: 2    Patient/Chart Verification  Patient ID Verified: Verbal  ID Band Applied: No  Consents Confirmed: Procedural, To be obtained in the Pre-Procedure area  Interval H&P(within 24 hr) Complete (required for Outpatients and Surgery Admit only): To be obtained in the Pre-Procedure area  Allergies Reviewed: Yes  Anticoag/NSAID held?: NA  Currently on antibiotics?: No    Assessment:   1.  Thoracic radiculopathy        Plan: Left T11-12 ROSALINE

## 2023-07-25 ENCOUNTER — TELEPHONE (OUTPATIENT)
Dept: PAIN MEDICINE | Facility: CLINIC | Age: 43
End: 2023-07-25

## 2024-02-06 ENCOUNTER — TELEPHONE (OUTPATIENT)
Dept: PSYCHIATRY | Facility: CLINIC | Age: 44
End: 2024-02-06

## 2024-02-06 NOTE — TELEPHONE ENCOUNTER
"Behavioral Health Outpatient Intake Questions    Referred By   : Self Referral     Please advise interviewee that they need to answer all questions truthfully to allow for best care, and any misrepresentations of information may affect their ability to be seen at this clinic   => Was this discussed? Yes     If Minor Child (under age 18)    Who is/are the legal guardian(s) of the child?     Is there a custody agreement? No     If \"YES\"- Custody orders must be obtained prior to scheduling the first appointment  In addition, Consent to Treatment must be signed by all legal guardians prior to scheduling the first appointment    If \"NO\"- Consent to Treatment must be signed by all legal guardians prior to scheduling the first appointment    Behavioral Health Outpatient Intake History -     Presenting Problem (in patient's own words):       Pt stated that he has depression.     Are there any communication barriers for this patient?     No                                               If yes, please describe barriers:  NONE   If there is a unique situation, please refer to Olaf Simon/Berenice Sky for final determination.    Are you taking any psychiatric medications? Yes     If \"YES\" -What are they Lexapro     If \"YES\" -Who prescribes? PCP     Has the Patient previously received outpatient Talk Therapy or Medication Management from [facility]  No        If \"YES\"- When, Where and with Whom?         If \"NO\" -Has Patient received these services elsewhere?       If \"YES\" -When, Where, and with Whom?    Has the Patient abused alcohol or other substances in the last 6 months ? No  No concerns of substance abuse are reported.     If \"YES\" -What substance, How much, How often?     If illegal substance: Refer to [facility] (for [facility]) or [facility].   If Alcohol in excess of 10 drinks per week:  Refer to [facility] (for [facility]) or [facility]    Legal History-     Is this treatment court ordered? No   If \"yes \"send " "to :  Talk Therapy : Send to Olaf Sky for final determination   Med Management: Send to Dr Frenandez for final determination     Has the Patient been convicted of a felony?  No   If \"Yes\" send to -When, What?  Talk Therapy : Send to Olaf Sky for final determination   Med Management: Send to Dr Fernandez for final determination     ACCEPTED as a patient Yes  If \"Yes\" Appointment Date: 4/15/2024    Referred Elsewhere? No  If “Yes” - (Where? Ex: [facility], [facility], [facility], [facility], etc.)       Name of Insurance Co:[employer]  Insurance ID#[id number]  Insurance Phone #  If ins is primary or scondary?Primary  If patient is a minor, parents information such as Name, D.O.B of guarantor.  "

## 2024-02-06 NOTE — TELEPHONE ENCOUNTER
Patient called in regards to setting up new patient appointment. Writer informed patient someone from intake will contact him to set up appointment    -Medication management   -Prefers [facility], but open to other providers  - [place] office  -Open for virtual appointments

## 2024-04-03 ENCOUNTER — OFFICE VISIT (OUTPATIENT)
Dept: INTERNAL MEDICINE CLINIC | Facility: CLINIC | Age: 44
End: 2024-04-03
Payer: COMMERCIAL

## 2024-04-03 VITALS
BODY MASS INDEX: 31.31 KG/M2 | SYSTOLIC BLOOD PRESSURE: 132 MMHG | HEIGHT: 69 IN | DIASTOLIC BLOOD PRESSURE: 86 MMHG | WEIGHT: 211.4 LBS

## 2024-04-03 DIAGNOSIS — E78.00 PURE HYPERCHOLESTEROLEMIA: ICD-10-CM

## 2024-04-03 DIAGNOSIS — F40.240 CLAUSTROPHOBIA: ICD-10-CM

## 2024-04-03 DIAGNOSIS — F41.9 ANXIETY: Primary | ICD-10-CM

## 2024-04-03 PROCEDURE — 99214 OFFICE O/P EST MOD 30 MIN: CPT | Performed by: INTERNAL MEDICINE

## 2024-04-03 RX ORDER — ALPRAZOLAM 0.25 MG/1
0.25 TABLET ORAL 3 TIMES DAILY PRN
Qty: 30 TABLET | Refills: 1 | Status: SHIPPED | OUTPATIENT
Start: 2024-04-03

## 2024-04-03 RX ORDER — ESCITALOPRAM OXALATE 10 MG/1
20 TABLET ORAL DAILY
Qty: 180 TABLET | Refills: 3 | Status: SHIPPED | OUTPATIENT
Start: 2024-04-03

## 2024-04-03 NOTE — ASSESSMENT & PLAN NOTE
Discussed the possibility of bipolar disorder and I recommend evaluation with psychiatry.  In the meantime will increase escitalopram from 10 mg daily to 20 mg daily, and will refill alprazolam so we can use that for some immediate relief.  Discussed that if patient is having symptoms of feeling overwhelmed where he cannot deal with things, that he have a plan in place where he can seek refuge, even if that is an inpatient stay to sort out these issues.  He does have wife for support.  Will also get CSR testing to help with his upcoming appointment with psychiatry

## 2024-04-03 NOTE — PROGRESS NOTES
Name: Aldair Still      : 1980      MRN: [id number]  Encounter Provider: Jasper Valladares MD  Encounter Date: 4/3/2024   Encounter department: [facility]    Assessment & Plan     1. Anxiety  Assessment & Plan:  Discussed the possibility of bipolar disorder and I recommend evaluation with psychiatry.  In the meantime will increase escitalopram from 10 mg daily to 20 mg daily, and will refill alprazolam so we can use that for some immediate relief.  Discussed that if patient is having symptoms of feeling overwhelmed where he cannot deal with things, that he have a plan in place where he can seek refuge, even if that is an inpatient stay to sort out these issues.  He does have wife for support.  Will also get [employer] testing to help with his upcoming appointment with psychiatry    Orders:  -     escitalopram (LEXAPRO) 10 mg tablet; Take 2 tablets (20 mg total) by mouth daily    2. Claustrophobia  -     ALPRAZolam (XANAX) 0.25 mg tablet; Take 1 tablet (0.25 mg total) by mouth 3 (three) times a day as needed for anxiety    3. Pure hypercholesterolemia  -     CBC and differential; Future  -     Comprehensive metabolic panel; Future        Depression Screening and Follow-up Plan: Patient's depression screening was positive with a PHQ-2 score of 3. Their PHQ-9 score was 14. Continue regular follow-up with their mental health provider who is managing their mental health condition(s).         Subjective     Pt here for problems with anxiety.  Pt reports his symptoms have been worsening over the past 2 months.  He is finding himself more short-tempered.  Has some racing thoughts.  Patient denies having symptoms like this in the past.  No hallucinations.  Patient denies making inappropriate financial or life decisions.      Review of Systems   Constitutional:  Positive for fatigue.   Psychiatric/Behavioral:  Positive for dysphoric mood. Negative for hallucinations and suicidal ideas (not  currently). The patient is nervous/anxious.        Past Medical History:   Diagnosis Date   • Basal cell carcinoma (BCC) of scalp or skin of neck    • Cancer (HCC)    • DDD (degenerative disc disease), lumbar 7/30/2018   • Left-sided thoracic back pain 5/25/2023     Past Surgical History:   Procedure Laterality Date   • NO PAST SURGERIES       Family History   Problem Relation Age of Onset   • Coronary artery disease Father    • Diabetes Father      Social History     Socioeconomic History   • Marital status: /Civil Union     Spouse name: None   • Number of children: None   • Years of education: None   • Highest education level: None   Occupational History   • None   Tobacco Use   • Smoking status: Never   • Smokeless tobacco: Never   Vaping Use   • Vaping status: Never Used   Substance and Sexual Activity   • Alcohol use: Yes     Alcohol/week: 1.0 standard drink of alcohol     Types: 1 Cans of beer per week   • Drug use: No   • Sexual activity: Yes     Partners: Female   Other Topics Concern   • None   Social History Narrative             Social Determinants of Health     Financial Resource Strain: Not on file   Food Insecurity: Not on file   Transportation Needs: Not on file   Physical Activity: Not on file   Stress: Not on file   Social Connections: Not on file   Intimate Partner Violence: Not on file   Housing Stability: Not on file     Current Outpatient Medications on File Prior to Visit   Medication Sig   • [DISCONTINUED] escitalopram (LEXAPRO) 10 mg tablet Take 1 tablet (10 mg total) by mouth daily   • [DISCONTINUED] ALPRAZolam (XANAX) 0.25 mg tablet Take 1 tablet (0.25 mg total) by mouth 3 (three) times a day as needed for anxiety (Patient not taking: Reported on 5/26/2023)     Allergies   Allergen Reactions   • Pollen Extract Allergic Rhinitis     Immunization History   Administered Date(s) Administered   • COVID-19 MODERNA VACC 0.5 ML IM 07/09/2021, 08/07/2021   • INFLUENZA  "01/01/2016   • Influenza Quadrivalent Preservative Free 3 years and older IM 01/01/2016   • Influenza Recombinant Preservative Free Im 11/04/2018   • Influenza, injectable, quadrivalent, preservative free 0.5 mL 10/26/2019, 03/02/2021       Objective     /86 (BP Location: Left arm, Patient Position: Sitting, Cuff Size: Standard)   Ht 5' 9\" (1.753 m)   Wt 95.9 kg (211 lb 6.4 oz)   BMI 31.22 kg/m²     Physical Exam  Constitutional:       General: He is not in acute distress.  Cardiovascular:      Rate and Rhythm: Normal rate and regular rhythm.      Heart sounds: Normal heart sounds. No murmur heard.  Pulmonary:      Effort: Pulmonary effort is normal. No respiratory distress.      Breath sounds: Normal breath sounds. No wheezing or rhonchi.   Neurological:      Mental Status: He is alert and oriented to person, place, and time.       Jasper Valladares MD    "

## 2024-04-03 NOTE — PATIENT INSTRUCTIONS
Problem List Items Addressed This Visit          Behavioral Health    Anxiety - Primary     Discussed the possibility of bipolar disorder and I recommend evaluation with psychiatry.  In the meantime will increase escitalopram from 10 mg daily to 20 mg daily, and will refill alprazolam so we can use that for some immediate relief.  Discussed that if patient is having symptoms of feeling overwhelmed where he cannot deal with things, that he have a plan in place where he can seek refuge, even if that is an inpatient stay to sort out these issues.  He does have wife for support.  Will also get [employer] testing to help with his upcoming appointment with psychiatry         Relevant Medications    escitalopram (LEXAPRO) 10 mg tablet       Other    Pure hypercholesterolemia    Relevant Orders    CBC and differential    Comprehensive metabolic panel     Other Visit Diagnoses       Claustrophobia        Relevant Medications    ALPRAZolam (XANAX) 0.25 mg tablet

## 2024-04-04 LAB
25(OH)D3+25(OH)D2 SERPL-MCNC: 21 NG/ML (ref 30–100)
ALBUMIN SERPL-MCNC: 4.5 G/DL (ref 3.5–5.7)
ALP SERPL-CCNC: 78 U/L (ref 35–120)
ALT SERPL-CCNC: 33 U/L
ANION GAP SERPL CALCULATED.3IONS-SCNC: 10 MMOL/L (ref 3–11)
AST SERPL-CCNC: 23 U/L
BASOPHILS # BLD AUTO: 0 THOU/CMM (ref 0–0.1)
BASOPHILS NFR BLD AUTO: 1 %
BILIRUB SERPL-MCNC: 0.9 MG/DL (ref 0.2–1)
BUN SERPL-MCNC: 17 MG/DL (ref 7–28)
CALCIUM SERPL-MCNC: 9.2 MG/DL (ref 8.5–10.1)
CHLORIDE SERPL-SCNC: 106 MMOL/L (ref 100–109)
CHOLEST SERPL-MCNC: 222 MG/DL
CHOLEST/HDLC SERPL: 6.3 {RATIO}
CO2 SERPL-SCNC: 24 MMOL/L (ref 21–31)
CREAT SERPL-MCNC: 0.89 MG/DL (ref 0.53–1.3)
CYTOLOGY CMNT CVX/VAG CYTO-IMP: ABNORMAL
DIFFERENTIAL METHOD BLD: NORMAL
EOSINOPHIL # BLD AUTO: 0.1 THOU/CMM (ref 0–0.5)
EOSINOPHIL NFR BLD AUTO: 1 %
ERYTHROCYTE [DISTWIDTH] IN BLOOD BY AUTOMATED COUNT: 12.6 % (ref 12–16)
GFR/BSA.PRED SERPLBLD CYS-BASED-ARV: 110 ML/MIN/{1.73_M2}
GLUCOSE SERPL-MCNC: 100 MG/DL (ref 65–99)
HCT VFR BLD AUTO: 43.9 % (ref 37–48)
HDLC SERPL-MCNC: 35 MG/DL (ref 23–92)
HGB BLD-MCNC: 15.6 G/DL (ref 12.5–17)
LDLC SERPL CALC-MCNC: 164 MG/DL
LYMPHOCYTES # BLD AUTO: 1.7 THOU/CMM (ref 1–3)
LYMPHOCYTES NFR BLD AUTO: 27 %
MCH RBC QN AUTO: 31.2 PG (ref 27–36)
MCHC RBC AUTO-ENTMCNC: 35.4 G/DL (ref 32–37)
MCV RBC AUTO: 88 FL (ref 80–100)
MONOCYTES # BLD AUTO: 0.5 THOU/CMM (ref 0.3–1)
MONOCYTES NFR BLD AUTO: 8 %
NEUTROPHILS # BLD AUTO: 3.9 THOU/CMM (ref 1.8–7.8)
NEUTROPHILS NFR BLD AUTO: 63 %
NONHDLC SERPL-MCNC: 187 MG/DL
PLATELET # BLD AUTO: 229 THOU/CMM (ref 140–350)
PMV BLD REES-ECKER: 8.1 FL (ref 7.5–11.3)
POTASSIUM SERPL-SCNC: 4.2 MMOL/L (ref 3.5–5.2)
PROT SERPL-MCNC: 7.1 G/DL (ref 6.3–8.3)
PSA SERPL-MCNC: 0.75 NG/ML
RBC # BLD AUTO: 4.99 MILL/CMM (ref 4–5.4)
SODIUM SERPL-SCNC: 140 MMOL/L (ref 135–145)
TRIGL SERPL-MCNC: 116 MG/DL
TSH SERPL-ACNC: 0.9 UIU/ML (ref 0.45–5.33)
WBC # BLD AUTO: 6.2 THOU/CMM (ref 4–10.5)

## 2024-04-07 NOTE — PSYCH
" PSYCHIATRIC EVALUATION     [facility]    Name and Date of Birth:  Aldair Still 43 y.o. 1980    Date of Visit: April 15, 2024    Reason for visit:  To establish care with psychiatry    HPI     Aldair is a 43 y.o. male with a history of Depression, Anxiety, Claustrophobia, Pure Hypercholesterolemia, Basal Cell Carcinoma, Thoracic DDD with radiculopathy, and Lumbar DDD.  Per EMR review-- the Tx of Pt's psychiatric Sxs appears to have been started by his PCP on 8/11/2016.  He was given Escitalopram 10mg qd and Alprazolam 0.25mg tid prn anxiety, but the Pt came off of them in approx 2017.  He was restarted on the SSRI by the PCP in 1/2/2019 due to resurgence of anxiety.   Eventually the Alprazolam was added back on again in 5/2023 by his PCP who also subsequently raised the dose of SSRI to 20mg on 4/3/2024-- notes reviewed.  At the 4/3/2024 medical visit, a PHq 9 was done with score of 14.  The PCP also felt the Pt should be evaluated for possible Bipolar Disorder.            Pt presents for psychiatric evaluation with primary c/o / Area of need:    \"There's been an increase in anxiety and depression in the past year\" mainly due to stressors related to work, family, insecurities about prior failed relationships, and the loss of his father 3 years ago.  He also endorses panic attacks since college, which have been occurring approx q couple of months over the pat several months, but the Sxs are not quite as intense as they had been in past years.  All present mood, anxiety, scope of panic Sxs and triggers are as described in below Hx, with exception that Pt does not presently have any thoughts that the family would be better off without him here.  He describes some irritability -- but mainly in the scope of anxiety or sometimes depression, and denies any other manic type Sxs. Pt presently denies self-injurious thoughts/behaviors, SI/intent/plan, HI, " "anxiety, or psychotic Sxs.  Pt reports compliance to psychiatric medications without SE.  He was first diagnosed and first treated for psychiatric issues by his PCP in 2018 and given the same medicines he is taking today and finds them at least partly helpful.  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  No missed days of work or lateness, without drop in performance.  Pt tried street THC in college, no addictions and denies any other illicit drug use or any h/o abuse.  He has 1 beer once a week or less often and denies any addiction/abuse.      HPI ROS Appetite Changes and Sleep: decreased sleep, fluctuating appetite, with comfort and stress eating at times, decreased energy      Review Of Systems:    Constitutional feeling tired   ENT negative   Cardiovascular as noted in HPI   Respiratory as noted in HPI   Gastrointestinal negative   Genitourinary negative   Musculoskeletal negative   Integumentary negative   Neurological as noted in HPI   Endocrine negative   Other Symptoms Sweating with panic attacks, all other systems are negative       Past Psychiatric History:     Pt grew up with biological parents and 2 elder half sisters from mom's prior relationship and 2 elder half sisters from father's prior relationship.  Pt describes his upbringing as \"I thought I had it pretty well\" financially secure, played sports.  His parents were supportive but strict -- father moreso than mom and Pt felt he had to \"Walk on eggshells\" at times.  Pt witnessed a lot of  arguing -- between half siblings and parents with their ex-partners.  Pt felt like he was in the middle at times although he and siblings got along well.  Pt also got along with parents pretty well, but all the familial arguing was stressful at times.  Parents did employ corporal punishment but no to what the Pt would consider an abusive level.  Parents  in 2003.  Once father passed, there was less contact with " "the half siblings from his side, but Pt remained close to all siblings.    Pt  his first wife in 2011 and  in 2012 due to wife's infidelity.  No children with first wife.  Pt had subsequent relationships with similar problems -- girlfriends who have cheated.  Pt felt like a failure, had a drop in his self-esteem.  He realizes the communication between himself and the prior women was poor.    He  his second wife in 2016-- and they communicate better than his prior relationships, but it is still a work in progress.  Any arguments bring memories of prior relationship failures, increases his insecurities, and he takes things too personally at times.      Pt was first diagnosed with a psychiatric illness (depression and anxiety) by his PCP in 2018 and started on Escitalopram and Alprazolam.    Depression started when his father passed away in 2021 of medical complications. Pt is unsure if he completed his grief, cannot stop thinking about his father, will cry at at times.  Other contributors: Stress with trying to be a good  and father. Sxs:  sadness, crying, but also irritable, anhedonia, negative thoughts-- (that he is failing as a  -- that he is not doing enough and father and that it might be better if he was not around.), insomnia, impaired concentration-(sometimes), energy, and motivation, fluctuating appetite, comfort eating at times, a sense of worthlessness, helplessness and hopelessness, death wishes, overt SI/intent/plan or attempts.               Anxiety started childhood -- first instigated by parental strictness. He also has worries about the kids' wellbeing, catastrophizes situations-- particularly regarding his marriage-- after an argument or if he and wife don't see eye to eye with the child rearing can induce thoughts that \"This is it\" and fears that the marriage will end.  His work can be stressful-- and Pt worries about how he does his work -- ie emails, doing " presentations/public speaking.  Sxs of excessive worry more days than not for longer than 3 months, he can be anxious when NOT necessarily depressed.  Other Sxs: difficulty concentrating, fatigue, insomnia, restlessness/keyed up, and muscle tension in neck and shoulders, racy thoughts, feels frozen at times, and sometimes gets irritability.    Panic attacks started in college first instigated by a girlfriend who cheated on him.  Also his first job where his employers were excessively criticizing and yelling at his co-workers or himself at times. Caused by arguments with his wife and thoughts that his marriage is over.  Panic episodes have been infrequent but recurred through time.  At one point they were happening q couple of months.   Sxs: palpitations/racing heart, sweating, trembling, shortness of breath, chest pain/pressure, fear of losing control, and sometimes an overheated feeling.       Social Anxiety symptoms started in childhood:  felt uncomfortable at school as a child, or even during sports/fun events -- would feel awkward, fear of judgment about his looks or his clothing.  Felt introverted and uncomfortable talking to other kids he did not know.  Even if he did know a peer, he would not initiate the interaction.  He would avoid gatherings with friends in grammar school and high school at times.  Only once in a while he avoided some gatherings in college.  This resolved in adulthood. Per Pt.      Eating Disorder symptoms: no historical or current eating disorder. no binge eating disorder; no anorexia nervosa. no symptoms of bulimia    Pt denies any OCD, PTSD, manic episodes, or psychotic Sxs    Prior psychiatrists: None prior per Pt    Prior psychotherapists:  Salazar Azul from 2/2024 -- ongoing for couples counseling and Pt also sees him individually once a week  Elen Crews individual counseling from approx 2017 - 2023    Pt denies any h/o SI, self-injurious thoughts/behaviors, HI, violent behaviors,   psychiatric hospitalizations, partial hospitalization programs, ECT, TMS, or legal or  hx.    Prior Rx trials:  Escitalopram up to 20mg (partly helpful), Alprazolam 0.25mg tid prn (helps), Medical cannabis (Tincture and vape) for anxiety (helped partly but the vape caused cough and the Tincture caused fatigue and emotional blunting)    Abuse Hx:  Pt denies any h/o verbal, physical or emotional abuse    Trauma Hx:    Father's death and preceding medical illnesses -- DM, COPD, surgeries.  He started to have falls as he got older.  Pt helped caregive -- organized his Rxs, brought him to [email or web address], did some grocery shopping for him.    2.  Having to get rid of a dog due to a family member's allergy      Family Psychiatric History:     Family History   Problem Relation Age of Onset    Anxiety disorder Mother     Anxiety disorder Father     Depression Father     Coronary artery disease Father     Diabetes Father     Depression Paternal Grandmother     Depression Half-Sister        Substance Use History:    Social History     Substance and Sexual Activity   Drug Use No       Social History:    Social History     Socioeconomic History    Marital status: /Civil Union     Spouse name: Not on file    Number of children: 2    Years of education: Not on file    Highest education level: Not on file   Occupational History    Occupation:      Comment: Full time   Tobacco Use    Smoking status: Never    Smokeless tobacco: Never   Vaping Use    Vaping status: Never Used   Substance and Sexual Activity    Alcohol use: Yes     Alcohol/week: 1.0 standard drink of alcohol     Types: 1 Cans of beer per week     Comment: 1 can of beer a week or less often    Drug use: No    Sexual activity: Yes     Partners: Female     Comment: wife uses an IUD   Other Topics Concern    Not on file   Social History Narrative    11:40AMMarried            Home: lives with wife and kids    Children: twin boys born 2018         Educational:    Pt denies any h/o learning disabilities and reached childhood milestones on time as far as he knows.   Mainstream schooled, had some brief help with reading (for a 2 month period)    Graduated HS 2000    BS in Architecture from [employer]           Social Determinants of Health     Financial Resource Strain: Not on file   Food Insecurity: Not on file   Transportation Needs: Not on file   Physical Activity: Not on file   Stress: Not on file   Social Connections: Not on file   Intimate Partner Violence: Not on file   Housing Stability: Not on file     Past Medical History:    History of Seizures: no  History of Head injury with loss of consciousness: no    Past Medical History:   Diagnosis Date    Basal cell carcinoma (BCC) of scalp or skin of neck     Cancer (HCC)     DDD (degenerative disc disease), lumbar 7/30/2018    Left-sided thoracic back pain 5/25/2023     Past Surgical History:   Procedure Laterality Date    MALIGNANT SKIN LESION EXCISION  01/2014    Posterior neck    NO PAST SURGERIES      WISDOM TOOTH EXTRACTION       Allergies:    Allergies   Allergen Reactions    Pollen Extract Allergic Rhinitis     History Review:    The following portions of the patient's history were reviewed and updated as appropriate: allergies, current medications, past family history, past medical history, past social history, past surgical history, and problem list.    OBJECTIVE:      Mental Status Evaluation:    Appearance age appropriate, casually dressed good eye contact and hygiene   Behavior pleasant, cooperative, calm with anxious bearing   Speech normal rate and volume, fluent, clear   Mood depressed, anxious, irritability isolated to times of anxiety or partly depression   Affect normal range and intensity   Thought Processes organized, coherent, goal directed, negative, impaired self-esteem   Associations intact associations   Thought Content no overt delusions   Perceptual Disturbances: no auditory  "hallucinations, no visual hallucinations, does not appear responding to internal stimuli   Abnormal Thoughts  Risk Potential Suicidal ideation - None  Homicidal ideation - None  Potential for aggression - No   Orientation oriented to person, place, situation, day of week, date, month of year, and year   Memory short term memory grossly intact   Cosciousness alert and awake   Attention Span attention span and concentration are age appropriate   Intellect appears to be of average intelligence   Insight fair   Judgement good   Muscle Strength and  Gait normal gait and normal balance   Language no difficulty naming common objects, no difficulty repeating a phrase   Fund of Knowledge adequate knowledge of current events  adequate fund of knowledge regarding past history  adequate fund of knowledge regarding vocabulary    Pain none   Pain Scale N/A       Laboratory Results: I have personally reviewed all pertinent laboratory/tests results.  Most Recent Labs:   Lab Results   Component Value Date    WBC 6.2 04/04/2024    RBC 4.99 04/04/2024    HGB 15.6 04/04/2024    HCT 43.9 04/04/2024     04/04/2024    RDW 12.6 04/04/2024    NEUTROABS 3.9 04/04/2024    SODIUM 140 04/04/2024    K 4.2 04/04/2024     04/04/2024    CO2 24 04/04/2024    BUN 17 04/04/2024    CREATININE 0.89 04/04/2024    GLUC 100 (H) 04/04/2024    CALCIUM 9.2 04/04/2024    AST 23 04/04/2024    ALT 33 04/04/2024    ALKPHOS 78 04/04/2024    TP 7.1 04/04/2024    ALB 4.5 04/04/2024    TBILI 0.9 04/04/2024    CHOLESTEROL 222 (H) 04/04/2024    HDL 35 04/04/2024    TRIG 116 04/04/2024    LDLCALC 164 (H) 04/04/2024 4/4/2024  Vitamin D Level:  21ng/mL    Lipase   Lab Results   Component Value Date    LIPASE 96 05/01/2023 2/26/2016 Brain MRI with/without contrast done for Lt arm numbness and weakness x several months:  result copied from EMR:     \" IMPRESSION:  Several tiny nonspecific T2 and FLAIR hyperintense foci involving the frontal lobes " "bilaterally.  Mild chronic microangiopathic disease, MS or Lyme disease considered possible etiologies.  Clinical correlation recommended     No acute pathology confirmed by diffusion imaging     No pathologic enhancement  \"      Assessment/Plan:     Diagnoses and all orders for this visit:    Generalized anxiety disorder  -     Drug Screen Routine w /Conf and Adulteration, urine.    Moderate recurrent major depression (HCC)  -     Drug Screen Routine w /Conf and Adulteration, urine.    Panic attacks  -     Drug Screen Routine w /Conf and Adulteration, urine.    Encounter for long-term (current) use of high-risk medication  -     Drug Screen Routine w /Conf and Adulteration, urine.          Plan:  Pt is having moderate anxiety, moderate intensity depression and panic attacks.  No Sxs constellation indicative of bipolar disorder.  Tx options discussed and Pt has been taking Escitalopram and Alprazolam by PCP with some benefit.  PCP just increased Escitalopram to max dose of 20mg and it is partly helpful.  Pt feels the Alprazolam helps but he rarely uses it.  Discussed options of Sertraline, Fluoxetine, Paroxetine, for mood, anxiety and panic.  Hydroxyzine also explained for adjunctive mgt of anxiety and panic.  Pt elects to continue the PCP's regimen at this time and will think about the other options mentioned today. Treatment plan done and Pt accepts the plan.   Continue:   Escitalopram 20mg (1) tab po qd-- Pt given a Rx for Qty 180 with R3 by PCP on 4/3/2024  Alprazolam 0.25mg (1) tab po tid prn anxiety -- Pt states he is not needing more   Vit D -- Pt gets OTC  Continue Individual counseling and marital counseling by Salazar Azul  Get UDS  Return 5/13/2024 as scheduled.  Can call any time sooner prn.  Pt made aware of the 24-7 on call service line.    Risks/Benefits/Precautions:      Risks, Benefits And Possible Side Effects Of Medications:    Risks, benefits, and possible side effects of medications explained to " Aldair and he verbalizes understanding and agreement for treatment.  Paper Safety Plan given for Pt to fill out and return.    Controlled Medication Discussion:     Aldair has been filling controlled prescriptions on time as prescribed according to [place] Prescription Drug Monitoring Program  Discussed with Aldair the risks of sedation, respiratory depression, impairment of ability to drive and potential for abuse and addiction related to treatment with benzodiazepine medications. He understands risk of treatment with benzodiazepine medications, agrees to not drive if feels impaired and agrees to take medications as prescribed    Jennifer Davison PA-C    Visit Time    Visit Start Time: 11:22AM  Visit Stop Time: 1:43PM  Total Visit Duration:  As above minutes

## 2024-04-15 ENCOUNTER — OFFICE VISIT (OUTPATIENT)
Dept: PSYCHIATRY | Facility: CLINIC | Age: 44
End: 2024-04-15

## 2024-04-15 VITALS — BODY MASS INDEX: 31.25 KG/M2 | HEIGHT: 69 IN | WEIGHT: 211 LBS

## 2024-04-15 DIAGNOSIS — F33.1 MODERATE RECURRENT MAJOR DEPRESSION (HCC): ICD-10-CM

## 2024-04-15 DIAGNOSIS — F41.1 GENERALIZED ANXIETY DISORDER: Primary | ICD-10-CM

## 2024-04-15 DIAGNOSIS — Z79.899 ENCOUNTER FOR LONG-TERM (CURRENT) USE OF HIGH-RISK MEDICATION: ICD-10-CM

## 2024-04-15 DIAGNOSIS — F41.0 PANIC ATTACKS: ICD-10-CM

## 2024-04-15 NOTE — BH TREATMENT PLAN
"TREATMENT PLAN (Medication Management Only)        [facility]    Name/Date of Birth/MRN:  Aldair Still 43 y.o. 1980 MRN: [id number]  Date of Treatment Plan: April 15, 2024  Diagnosis/Diagnoses:   1. Generalized anxiety disorder    2. Moderate recurrent major depression (HCC)    3. Panic attacks    4. Encounter for long-term (current) use of high-risk medication      Strengths/Personal Resources for Self-Care: \"Creative; Caring; Athletic\".  Area/Areas of need (in own words): \"There's been an increase in anxiety and depression in the past year \".  1. Long Term Goal:   Maintain mood stability and control of anxiety and panic  Target Date:  3-6 months  Person/Persons responsible for completion of goal: Aldair and Salazar Rodriguez (private therapist)  2.  Short Term Objective (s) - How will we reach this goal?:   A.  Provider new recommended medication/dosage changes and/or continue medication(s):   Pt is having moderate anxiety, moderate intensity depression and panic attacks.  No Sxs constellation indicative of bipolar disorder.  Tx options discussed and Pt has been taking Escitalopram and Alprazolam by PCP with some benefit.  PCP just increased Escitalopram to max dose of 20mg and it is partly helpful.  Pt feels the Alprazolam helps but he rarely uses it.  Discussed options of Sertraline, Fluoxetine, Paroxetine, for mood, anxiety and panic.  Hydroxyzine also explained for adjunctive mgt of anxiety and panic.  Pt elects to continue the PCP's regimen at this time and will think about the other options mentioned today. Treatment plan done and Pt accepts the plan.   Continue:   Escitalopram 20mg (1) tab po qd-- Pt given a Rx for Qty 180 with R3 by PCP on 4/3/2024  Alprazolam 0.25mg (1) tab po tid prn anxiety -- Pt states he is not needing more   Vit D -- Pt gets OTC  Continue Individual counseling and marital counseling by Salazar Azul  Get " UDS  Return 5/13/2024 as scheduled.  Can call any time sooner prn.  Pt made aware of the 24-7 on call service line.    Target Date:  3-6 months  Person/Persons Responsible for Completion of Goal: Salazar Spears   Progress Towards Goals:  3-6 months  Treatment Modality:  Medication mgt and psychotherapy  Review due 180 days from date of this plan: 6 months - 10/15/2024  Expected length of service: ongoing treatment unless revised  My Physician/PA/NP and I have developed this plan together and I agree to work on the goals and objectives. I understand the treatment goals that were developed for my treatment.  Electronic Signatures: on file (unless signed below)    Jennifer Davison PA-C 04/15/24

## 2024-05-05 NOTE — PSYCH
"MEDICATION MANAGEMENT NOTE        [facility]      Name and Date of Birth:  Aldair Still 43 y.o. 1980    Date of Visit: May 13, 2024    HPI:    Aldair Still is here for medication review with primary c/o \"Worrying about the kids, getting whatever we need to get done for them for school.\"  Also how the kids react to things-- his son is having tantrums at 5y.o.  He has anxiety -- mainly due to work stressors especially when he has to do presentations, but also due to the family stressors.  However, the overall effect of Escitalopram increase helped with his mood, anxiety and panic.  His panic episodes have been less frequent and less intense to where he can nip them in the bud most of the time now.   LAst panic episode was 1 week ago, with the scope of the panic Sxs being the same as per PMH:  palpitations/racing heart, sweating, trembling, shortness of breath, chest pain/pressure, fear of losing control, and sometimes an overheated feeling.    He reports feeling more irritable and defensive but the anxiety and SE of tiredness is driving some of this.  He reports his appetite has been good though he can still stress eat at times.  His sleep can be occasionally disrupted by anxiety but generally better and good overall since last visit.  Unfortunately, the Pt has felt more tired with some Wt gain since the increase in SSRI to 20mg.  Pt presently denies self-injurious thoughts/behaviors, SI, HI, or psychotic Sxs.  Pt reports compliance to psychiatric medications with SE tiredness.   Pt continues individual and marital counseling privately with Salazar Azul LPC.    Appetite Changes and Sleep:  Sleep is good overall, normal appetite, with some stress eating, decreased energy    Review Of Systems:      Constitutional feeling tired   ENT negative   Cardiovascular as noted in HPI   Respiratory as noted in HPI   Gastrointestinal negative   Genitourinary " "negative   Musculoskeletal negative   Integumentary negative   Neurological negative   Endocrine negative   Other Symptoms Sweating with panic attacks, all other systems are negative       Past Psychiatric History:   As copied from my 4/15/2024 note with updates as needed:  \" [  Pt grew up with biological parents and 2 elder half sisters from mom's prior relationship and 2 elder half sisters from father's prior relationship.  Pt describes his upbringing as \"I thought I had it pretty well\" financially secure, played sports.  His parents were supportive but strict -- father moreso than mom and Pt felt he had to \"Walk on eggshells\" at times.  Pt witnessed a lot of  arguing -- between half siblings and parents with their ex-partners.  Pt felt like he was in the middle at times although he and siblings got along well.  Pt also got along with parents pretty well, but all the familial arguing was stressful at times.  Parents did employ corporal punishment but no to what the Pt would consider an abusive level.  Parents  in 2003.  Once father passed, there was less contact with the half siblings from his side, but Pt remained close to all siblings.     Pt  his first wife in 2011 and  in 2012 due to wife's infidelity.  No children with first wife.  Pt had subsequent relationships with similar problems -- girlfriends who have cheated.  Pt felt like a failure, had a drop in his self-esteem.  He realizes the communication between himself and the prior women was poor.    He  his second wife in 2016-- and they communicate better than his prior relationships, but it is still a work in progress.  Any arguments bring memories of prior relationship failures, increases his insecurities, and he takes things too personally at times.       Pt was first diagnosed with a psychiatric illness (depression and anxiety) by his PCP in 2018 and started on Escitalopram and Alprazolam.     Depression started when his " "father passed away in 2021 of medical complications. Pt is unsure if he completed his grief, cannot stop thinking about his father, will cry at at times.  Other contributors: Stress with trying to be a good  and father. Sxs:  sadness, crying, but also irritable, anhedonia, negative thoughts-- (that he is failing as a  -- that he is not doing enough and father and that it might be better if he was not around.), insomnia, impaired concentration-(sometimes), energy, and motivation, fluctuating appetite, comfort eating at times, a sense of worthlessness, helplessness and hopelessness, death wishes, overt SI/intent/plan or attempts.                Anxiety started childhood -- first instigated by parental strictness. He also has worries about the kids' wellbeing, catastrophizes situations-- particularly regarding his marriage-- after an argument or if he and wife don't see eye to eye with the child rearing can induce thoughts that \"This is it\" and fears that the marriage will end.  His work can be stressful-- and Pt worries about how he does his work -- ie emails, doing presentations/public speaking.  Sxs of excessive worry more days than not for longer than 3 months, he can be anxious when NOT necessarily depressed.  Other Sxs: difficulty concentrating, fatigue, insomnia, restlessness/keyed up, and muscle tension in neck and shoulders, racy thoughts, feels frozen at times, and sometimes gets irritability.     Panic attacks started in college first instigated by a girlfriend who cheated on him.  Also his first job where his employers were excessively criticizing and yelling at his co-workers or himself at times. Caused by arguments with his wife and thoughts that his marriage is over.  Panic episodes have been infrequent but recurred through time.  At one point they were happening q couple of months.   Sxs: palpitations/racing heart, sweating, trembling, shortness of breath, chest pain/pressure, fear of " "losing control, and sometimes an overheated feeling.        Social Anxiety symptoms started in childhood:  felt uncomfortable at school as a child, or even during sports/fun events -- would feel awkward, fear of judgment about his looks or his clothing.  Felt introverted and uncomfortable talking to other kids he did not know.  Even if he did know a peer, he would not initiate the interaction.  He would avoid gatherings with friends in grammar school and high school at times.  Only once in a while he avoided some gatherings in college.  This resolved in adulthood. Per Pt.       Eating Disorder symptoms: no historical or current eating disorder. no binge eating disorder; no anorexia nervosa. no symptoms of bulimia     Pt denies any OCD, PTSD, manic episodes, or psychotic Sxs     Prior psychiatrists: None prior per Pt     Prior psychotherapists:  Salazar Azul from 2/2024 -- ongoing for couples counseling and Pt also sees him individually once a week  Elen Crews individual counseling from approx 2017 - 2023     Pt denies any h/o SI, self-injurious thoughts/behaviors, HI, violent behaviors,  psychiatric hospitalizations, partial hospitalization programs, ECT, TMS, or legal or  hx.     Prior Rx trials:  Escitalopram up to 20mg (partly helpful), Alprazolam 0.25mg tid prn (helps), Medical cannabis (Tincture and vape) for anxiety (helped partly but the vape caused cough and the Tincture caused fatigue and emotional blunting)     Abuse Hx:  Pt denies any h/o verbal, physical or emotional abuse     Trauma Hx:    Father's death and preceding medical illnesses -- DM, COPD, surgeries.  He started to have falls as he got older.  Pt helped caregive -- organized his Rxs, brought him to [employer], did some grocery shopping for him.    2.  Having to get rid of a dog due to a family member's allergy     ] \"      Past Medical History:    Past Medical History:   Diagnosis Date    Basal cell carcinoma (BCC) of scalp or skin of neck  "    Cancer (HCC)     DDD (degenerative disc disease), lumbar 7/30/2018    Left-sided thoracic back pain 5/25/2023       Substance Abuse History:    Social History     Substance and Sexual Activity   Alcohol Use Yes    Alcohol/week: 1.0 standard drink of alcohol    Types: 1 Cans of beer per week    Comment: 1 can of beer a week or less often     Social History     Substance and Sexual Activity   Drug Use No       Social History:    Social History     Socioeconomic History    Marital status: /Civil Union     Spouse name: Not on file    Number of children: 2    Years of education: Not on file    Highest education level: Not on file   Occupational History    Occupation:      Comment: Full time   Tobacco Use    Smoking status: Never    Smokeless tobacco: Never   Vaping Use    Vaping status: Never Used   Substance and Sexual Activity    Alcohol use: Yes     Alcohol/week: 1.0 standard drink of alcohol     Types: 1 Cans of beer per week     Comment: 1 can of beer a week or less often    Drug use: No    Sexual activity: Yes     Partners: Female     Comment: wife uses an IUD   Other Topics Concern    Not on file   Social History Narrative    11:40AMMarried            Home: lives with wife and kids    Children: twin boys born 2018        Educational:    Pt denies any h/o learning disabilities and reached childhood milestones on time as far as he knows.   Mainstream schooled, had some brief help with reading (for a 2 month period)    Graduated HS 2000    BS in Architecture from [employer]           Social Determinants of Health     Financial Resource Strain: Not on file   Food Insecurity: Not on file   Transportation Needs: Not on file   Physical Activity: Not on file   Stress: Not on file   Social Connections: Not on file   Intimate Partner Violence: Not on file   Housing Stability: Not on file       Family Psychiatric History:     Family History   Problem Relation Age of Onset    Anxiety disorder  Mother     Anxiety disorder Father     Depression Father     Coronary artery disease Father     Diabetes Father     Depression Paternal Grandmother     Depression Half-Sister        History Review: The following portions of the patient's history were reviewed and updated as appropriate: allergies, current medications, past family history, past medical history, past social history, past surgical history, and problem list.         OBJECTIVE:     Mental Status Evaluation:    Appearance Casually dressed, good eye contact and hygiene, Good eye contact   Behavior Calm, cooperative, pleasant   Speech Clear, normal rate and volume   Mood Depressed, anxious   Affect Normal range and intensity   Thought Processes Organized, goal directed, less negative, ruminative, worrisome, working on self-esteem   Associations Intact   Thought Content No delusions   Perceptual Disturbances: Pt denies any form of hallucinations and does not appear to be responding to internal stimuli   Abnormal Thoughts  Risk Potential Suicidal ideation - None  Homicidal ideation - None  Potential for aggression - No   Orientation oriented to person, place, situation, day of week, date, month of year, and year   Memory short term memory grossly intact   Cosciousness alert and awake   Attention Span attention span and concentration are age appropriate   Intellect appears to be of average intelligence   Insight fair   Judgement good   Muscle Strength and  Gait normal gait and normal balance   Language no difficulty naming common objects, no difficulty repeating a phrase   Fund of Knowledge adequate knowledge of current events  adequate fund of knowledge regarding past history  adequate fund of knowledge regarding vocabulary    Pain none   Pain Scale 0       Laboratory Results: None new since last visit    Assessment/plan:       Diagnoses and all orders for this visit:    Generalized anxiety disorder    Moderate recurrent major depression (HCC)    Panic  attacks            PLAN:  Pt is having milder depressive, anxious and panic Sxs, but with SE from the Escitalopram 20mg dose level.  Tx options discussed and Pt would feel more comfortable trying a lower dose of the Escitalopram (has never tried 15mg) to mitigate SE.  Reviewed other options we could potentially try in the future if the present SSRI does not work out-- ie Fluoxetine, Sertraline, Paroxetine.  No change in the Alprazolam-- which has been helpful with panic. Pt accepts the plan.  Tx plan due next visit.  Safety Plan done today.  Continue:  Reduce Escitalopram to 10mg + 5mg (1) tab po qd # 90 each  Alprazolam 0.25mg (1) tab po tid prn anxiety -- Pt states he has enough from prior Rx  Vit D - Pt gets OTC  Continue individual and marital counseling with Salazar Azul LPC through [facility]  2nd request for UDS-- new requisition given  Return 8-9 weeks.  Can call any time sooner prn.       Risks/Benefits      Risks, Benefits And Possible Side Effects Of Medications:    Risks, benefits, and possible side effects of medications explained to Aldair and he verbalizes understanding and agreement for treatment.    Controlled Medication Discussion:     Aldair has been filling controlled prescriptions on time as prescribed according to [place] Prescription Drug Monitoring Program  Discussed with Aldair the risks of sedation, respiratory depression, impairment of ability to drive and potential for abuse and addiction related to treatment with benzodiazepine medications. He understands risk of treatment with benzodiazepine medications, agrees to not drive if feels impaired and agrees to take medications as prescribed    Visit Time    Visit Start Time: 9:18AM  Visit Stop Time: 10:04AM  Total Visit Duration:  As above minutes

## 2024-05-13 ENCOUNTER — OFFICE VISIT (OUTPATIENT)
Dept: PSYCHIATRY | Facility: CLINIC | Age: 44
End: 2024-05-13

## 2024-05-13 DIAGNOSIS — F33.1 MODERATE RECURRENT MAJOR DEPRESSION (HCC): ICD-10-CM

## 2024-05-13 DIAGNOSIS — F41.1 GENERALIZED ANXIETY DISORDER: Primary | ICD-10-CM

## 2024-05-13 DIAGNOSIS — F41.0 PANIC ATTACKS: ICD-10-CM

## 2024-05-13 RX ORDER — ESCITALOPRAM OXALATE 5 MG/1
5 TABLET ORAL DAILY
Qty: 90 TABLET | Refills: 0 | Status: SHIPPED | OUTPATIENT
Start: 2024-05-13

## 2024-05-13 RX ORDER — ESCITALOPRAM OXALATE 10 MG/1
10 TABLET ORAL DAILY
Qty: 90 TABLET | Refills: 0 | Status: SHIPPED | OUTPATIENT
Start: 2024-05-13

## 2024-05-13 NOTE — BH CRISIS PLAN
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\"   \"Sometimes meditation and music\"            Step 3: People and social settings that provide distraction:   Name Contact Information   Wife Edna [phone]   Friend Martínez Telephone   Friend Tesfaye Gibson    Places   Go to his friend's homes   Go out to eat at a restaurant           Step 4: People whom I can ask for help during a crisis:      Name Contact Information    As per Step 3       Step 5: Professionals or agencies I can contact during a crisis:      Clinican/Agency Name Phone Emergency Contact    Psychotherapist Salazar Azul [phone]     [facility] [phone]     [facility] [phone]          Suicide Prevention Lifeline 988       Local Emergency Department Emergency Department Phone Emergency Department Address    [facility]          Crisis Phone Numbers:   Suicide Prevention Lifeline: Call or Text  988 Crisis Text Line: Text HOME to 044-065   Please note: Some [facility] do not have a separate number for Child/Adolescent specific crisis. If your county is not listed under Child/Adolescent, please call the adult number for your county      Adult Crisis Numbers: Child/Adolescent Crisis Numbers   [facility]: [phone] [facility]: [phone]   [facility]: [phone] [facility]: [phone]   [facility]: [phone] [place]: [phone]   [facility]: [phone] " "[place]: [phone]   [facility]: [phone]   [facility]: [phone]   [facility]: [phone]   [facility]: [phone] (daytime) [phone] (after hours, weekends, holidays)      Step 6: Making the environment safer (plan for lethal means safety):   Patient did not identify any lethal methods: Yes     Optional: What is most important to me and worth living for?   \"My wife and my kids\"     [place] Safety Plan. Edna Rossi and Jaya Mueller. Used with permission of the authors.           "

## 2024-07-24 ENCOUNTER — RA CDI HCC (OUTPATIENT)
Dept: OTHER | Facility: HOSPITAL | Age: 44
End: 2024-07-24

## 2024-08-06 ENCOUNTER — OFFICE VISIT (OUTPATIENT)
Dept: INTERNAL MEDICINE CLINIC | Facility: CLINIC | Age: 44
End: 2024-08-06
Payer: COMMERCIAL

## 2024-08-06 VITALS
BODY MASS INDEX: 31.52 KG/M2 | OXYGEN SATURATION: 96 % | DIASTOLIC BLOOD PRESSURE: 82 MMHG | SYSTOLIC BLOOD PRESSURE: 128 MMHG | WEIGHT: 212.8 LBS | HEART RATE: 64 BPM | HEIGHT: 69 IN

## 2024-08-06 DIAGNOSIS — Z12.5 SCREENING FOR PROSTATE CANCER: ICD-10-CM

## 2024-08-06 DIAGNOSIS — R73.01 IMPAIRED FASTING GLUCOSE: ICD-10-CM

## 2024-08-06 DIAGNOSIS — Z00.00 ENCOUNTER FOR WELLNESS EXAMINATION: Primary | ICD-10-CM

## 2024-08-06 DIAGNOSIS — F41.1 GENERALIZED ANXIETY DISORDER: ICD-10-CM

## 2024-08-06 DIAGNOSIS — E78.00 PURE HYPERCHOLESTEROLEMIA: ICD-10-CM

## 2024-08-06 DIAGNOSIS — E55.9 VITAMIN D DEFICIENCY: ICD-10-CM

## 2024-08-06 PROCEDURE — 99396 PREV VISIT EST AGE 40-64: CPT | Performed by: INTERNAL MEDICINE

## 2024-08-06 NOTE — PROGRESS NOTES
Ambulatory Visit  Name: Aldair Still      : 1980      MRN: [id number]  Encounter Provider: Jasper Valladares MD  Encounter Date: 2024   Encounter department: [facility]    Assessment & Plan   1. Encounter for wellness examination  Assessment & Plan:  Discussed preventative health, cancer screening, immunizations, and safety issues.  I recommend colonoscopy at age 47 unless patient were to develop a family history of early colon cancer.  I recommend yearly flu shot.  Patient reports having a tetanus shot in the past 10 years.  2. Generalized anxiety disorder  Assessment & Plan:  Patient doing well on escitalopram 15 mg daily and alprazolam as needed, he is also getting counseling  3. Impaired fasting glucose  -     Hemoglobin A1C; Future; Expected date: 2025  4. Pure hypercholesterolemia  -     CBC and differential; Future; Expected date: 2025  -     Comprehensive metabolic panel; Future; Expected date: 2025  -     Lipid Panel with Direct LDL reflex; Future; Expected date: 2025  -     TSH, 3rd generation with Free T4 reflex; Future; Expected date: 2025  5. Vitamin D deficiency  -     Vitamin D 25 hydroxy; Future; Expected date: 2025  6. Screening for prostate cancer  -     PSA, Total Screen; Future; Expected date: 2025         History of Present Illness     Wellness: Patient needs a healthy diet, no smoking cigarettes, gets routine dental care, is active      Review of Systems   Constitutional:  Negative for chills, fatigue and fever.   HENT:  Negative for congestion, nosebleeds, postnasal drip, sore throat and trouble swallowing.    Eyes:  Negative for pain.   Respiratory:  Negative for cough, chest tightness, shortness of breath and wheezing.    Cardiovascular:  Negative for chest pain, palpitations and leg swelling.   Gastrointestinal:  Negative for abdominal pain, constipation, diarrhea, nausea and vomiting.   Endocrine: Negative  for polydipsia and polyuria.   Genitourinary:  Negative for dysuria, flank pain and hematuria.   Musculoskeletal:  Negative for arthralgias.   Skin:  Negative for rash.   Neurological:  Negative for dizziness, tremors, light-headedness and headaches.   Hematological:  Does not bruise/bleed easily.   Psychiatric/Behavioral:  Negative for confusion and dysphoric mood. The patient is not nervous/anxious.      Past Medical History:   Diagnosis Date   • Basal cell carcinoma (BCC) of scalp or skin of neck    • Cancer (HCC)    • DDD (degenerative disc disease), lumbar 7/30/2018   • Left-sided thoracic back pain 5/25/2023     Past Surgical History:   Procedure Laterality Date   • MALIGNANT SKIN LESION EXCISION  01/2014    Posterior neck   • NO PAST SURGERIES     • WISDOM TOOTH EXTRACTION       Family History   Problem Relation Age of Onset   • Anxiety disorder Mother    • Anxiety disorder Father    • Depression Father    • Coronary artery disease Father    • Diabetes Father    • Depression Paternal Grandmother    • Depression Half-Sister      Social History     Tobacco Use   • Smoking status: Never   • Smokeless tobacco: Never   Vaping Use   • Vaping status: Never Used   Substance and Sexual Activity   • Alcohol use: Yes     Alcohol/week: 1.0 standard drink of alcohol     Types: 1 Cans of beer per week     Comment: 1 can of beer a week or less often   • Drug use: No   • Sexual activity: Yes     Partners: Female     Comment: wife uses an IUD     Current Outpatient Medications on File Prior to Visit   Medication Sig   • ALPRAZolam (XANAX) 0.25 mg tablet Take 1 tablet (0.25 mg total) by mouth 3 (three) times a day as needed for anxiety   • escitalopram (LEXAPRO) 10 mg tablet Take 1 tablet (10 mg total) by mouth daily Take with the 5mg tab for a total of 15mg daily   • escitalopram (LEXAPRO) 5 mg tablet Take 1 tablet (5 mg total) by mouth daily Take with the 10mg tab for a total of 15mg daily     Allergies   Allergen Reactions  "  • Pollen Extract Allergic Rhinitis     Immunization History   Administered Date(s) Administered   • COVID-19 MODERNA VACC 0.5 ML IM 07/09/2021, 08/07/2021   • INFLUENZA 01/01/2016   • Influenza Quadrivalent Preservative Free 3 years and older IM 01/01/2016   • Influenza Recombinant Preservative Free Im 11/04/2018   • Influenza, injectable, quadrivalent, preservative free 0.5 mL 10/26/2019, 03/02/2021     Objective     /82 (BP Location: Left arm, Patient Position: Sitting, Cuff Size: Large)   Pulse 64   Ht 5' 9\" (1.753 m)   Wt 96.5 kg (212 lb 12.8 oz)   SpO2 96%   BMI 31.43 kg/m²     Physical Exam  Vitals reviewed.   Constitutional:       General: He is not in acute distress.     Appearance: Normal appearance. He is well-developed.   HENT:      Head: Normocephalic and atraumatic.      Right Ear: External ear normal.      Left Ear: External ear normal.      Nose: Nose normal.   Eyes:      General: No scleral icterus.     Conjunctiva/sclera: Conjunctivae normal.   Neck:      Vascular: No carotid bruit.      Trachea: No tracheal deviation.   Cardiovascular:      Rate and Rhythm: Normal rate and regular rhythm.      Heart sounds: Normal heart sounds. No murmur heard.  Pulmonary:      Effort: Pulmonary effort is normal. No respiratory distress.      Breath sounds: Normal breath sounds. No wheezing or rales.   Abdominal:      General: Bowel sounds are normal.      Palpations: Abdomen is soft. There is no mass.      Tenderness: There is no abdominal tenderness. There is no guarding.      Hernia: There is no hernia in the left inguinal area or right inguinal area.   Genitourinary:     Testes: Normal.   Musculoskeletal:      Cervical back: Normal range of motion and neck supple.      Right lower leg: No edema.      Left lower leg: No edema.   Lymphadenopathy:      Cervical: No cervical adenopathy.   Skin:     Coloration: Skin is not jaundiced or pale.   Neurological:      General: No focal deficit present.      " Mental Status: He is alert and oriented to person, place, and time.   Psychiatric:         Mood and Affect: Mood normal.         Behavior: Behavior normal.         Thought Content: Thought content normal.         Judgment: Judgment normal.

## 2024-08-06 NOTE — ASSESSMENT & PLAN NOTE
Discussed preventative health, cancer screening, immunizations, and safety issues.  I recommend colonoscopy at age 47 unless patient were to develop a family history of early colon cancer.  I recommend yearly flu shot.  Patient reports having a tetanus shot in the past 10 years.

## 2024-08-06 NOTE — ASSESSMENT & PLAN NOTE
Patient doing well on escitalopram 15 mg daily and alprazolam as needed, he is also getting counseling

## 2024-08-06 NOTE — PATIENT INSTRUCTIONS
Problem List Items Addressed This Visit          Behavioral Health    Generalized anxiety disorder     Patient doing well on escitalopram 15 mg daily and alprazolam as needed, he is also getting counseling            Other    Encounter for wellness examination - Primary     Discussed preventative health, cancer screening, immunizations, and safety issues.  I recommend colonoscopy at age 47 unless patient were to develop a family history of early colon cancer.  I recommend yearly flu shot.  Patient reports having a tetanus shot in the past 10 years.

## 2024-08-15 DIAGNOSIS — F41.1 GENERALIZED ANXIETY DISORDER: ICD-10-CM

## 2024-08-15 DIAGNOSIS — F33.1 MODERATE RECURRENT MAJOR DEPRESSION (HCC): ICD-10-CM

## 2024-08-15 RX ORDER — ESCITALOPRAM OXALATE 5 MG/1
TABLET ORAL
Qty: 90 TABLET | Refills: 0 | Status: SHIPPED | OUTPATIENT
Start: 2024-08-15

## 2024-08-28 DIAGNOSIS — F33.1 MODERATE RECURRENT MAJOR DEPRESSION (HCC): ICD-10-CM

## 2024-08-28 DIAGNOSIS — F41.1 GENERALIZED ANXIETY DISORDER: ICD-10-CM

## 2024-08-28 RX ORDER — ESCITALOPRAM OXALATE 10 MG/1
TABLET ORAL
Qty: 90 TABLET | Refills: 0 | Status: SHIPPED | OUTPATIENT
Start: 2024-08-28

## 2024-12-26 ENCOUNTER — OFFICE VISIT (OUTPATIENT)
Dept: URGENT CARE | Age: 44
End: 2024-12-26
Payer: COMMERCIAL

## 2024-12-26 ENCOUNTER — APPOINTMENT (OUTPATIENT)
Dept: RADIOLOGY | Age: 44
End: 2024-12-26
Payer: COMMERCIAL

## 2024-12-26 VITALS
DIASTOLIC BLOOD PRESSURE: 84 MMHG | OXYGEN SATURATION: 97 % | TEMPERATURE: 98.4 F | RESPIRATION RATE: 18 BRPM | SYSTOLIC BLOOD PRESSURE: 138 MMHG | HEART RATE: 88 BPM | WEIGHT: 213 LBS | BODY MASS INDEX: 31.45 KG/M2

## 2024-12-26 DIAGNOSIS — J02.9 SORE THROAT: ICD-10-CM

## 2024-12-26 DIAGNOSIS — J32.9 SINOBRONCHITIS: Primary | ICD-10-CM

## 2024-12-26 DIAGNOSIS — R05.1 ACUTE COUGH: ICD-10-CM

## 2024-12-26 DIAGNOSIS — J40 SINOBRONCHITIS: Primary | ICD-10-CM

## 2024-12-26 LAB — S PYO AG THROAT QL: NEGATIVE

## 2024-12-26 PROCEDURE — G0382 LEV 3 HOSP TYPE B ED VISIT: HCPCS | Performed by: STUDENT IN AN ORGANIZED HEALTH CARE EDUCATION/TRAINING PROGRAM

## 2024-12-26 PROCEDURE — 87880 STREP A ASSAY W/OPTIC: CPT | Performed by: STUDENT IN AN ORGANIZED HEALTH CARE EDUCATION/TRAINING PROGRAM

## 2024-12-26 PROCEDURE — 71046 X-RAY EXAM CHEST 2 VIEWS: CPT

## 2024-12-26 PROCEDURE — S9083 URGENT CARE CENTER GLOBAL: HCPCS | Performed by: STUDENT IN AN ORGANIZED HEALTH CARE EDUCATION/TRAINING PROGRAM

## 2024-12-26 RX ORDER — DOXYCYCLINE 100 MG/1
100 CAPSULE ORAL 2 TIMES DAILY
Qty: 14 CAPSULE | Refills: 0 | Status: SHIPPED | OUTPATIENT
Start: 2024-12-26 | End: 2025-01-02

## 2024-12-26 NOTE — LETTER
December 26, 2024     Patient: Aldair Still   YOB: 1980   Date of Visit: 12/26/2024       To Whom it May Concern:    Aldair Still was seen in my clinic on 12/26/2024.  Please excuse him from work today 12/26 and tomorrow 12/27/2024.  If you have any questions or concerns, please don't hesitate to call.         Sincerely,          Mary Anne Kauffman, DO

## 2024-12-26 NOTE — PROGRESS NOTES
[facility]        NAME: Aldair Still is a 44 y.o. male  : 1980    MRN: [id number]  DATE: 2024  TIME: 9:43 PM    Assessment and Plan   Sinobronchitis [J32.9, J40]  1. Sinobronchitis  doxycycline monohydrate (MONODOX) 100 mg capsule      2. Sore throat  POCT rapid ANTIGEN strepA      3. Acute cough  XR chest pa and lateral            Patient Instructions   Your rapid strep test was negative.  Radiologist will provide a final read of your chest x-ray, you can follow results your MyChart.  I am treating you with antibiotics which will cover sinus infection as well as chest infection.  I recommend rest, good hydration, follow-up with PCP next week if your symptoms are not adequately improving.  If you develop any severe worsening symptoms such as shortness of breath, please go to the ER.    Follow up with PCP in 3-5 days.  Proceed to  ER if symptoms worsen.    If tests have been performed at [facility], our office will contact you with results if changes need to be made to the care plan discussed with you at the visit.  You can review your full results on [facility] MyChart.    Chief Complaint     Chief Complaint   Patient presents with    Cough    Sore Throat     Pt c/o productive cough for past month. Sputum is yellow/greenish to brown. Has taken Theraflu last week. Reports body aches beginning just this morning.          History of Present Illness       Patient presents for symptoms that started about 1 month ago and have been changing/worsening recently.  Initially started off with viral gastroenteritis type symptoms about 1 month ago, his child and wife had similar symptoms, diarrhea, vomiting.  He had the symptoms but then also had some nasal congestion.  Got over the GI symptoms, but has had sinus pressure, nasal congestion, postnasal drip, cough since then.  Cough is productive intermittently of brown mucus.  Intermittent shortness of breath.  Over the last couple of days,  "also having significant sore throat, body aches.  Had a couple of exposures to \"walking pneumonia\".    Cough  Associated symptoms include a sore throat.   Sore Throat   Associated symptoms include coughing.       Review of Systems   Review of Systems   HENT:  Positive for sore throat.    Respiratory:  Positive for cough.    All other systems reviewed and are negative.        Current Medications       Current Outpatient Medications:     ALPRAZolam (XANAX) 0.25 mg tablet, Take 1 tablet (0.25 mg total) by mouth 3 (three) times a day as needed for anxiety, Disp: 30 tablet, Rfl: 1    doxycycline monohydrate (MONODOX) 100 mg capsule, Take 1 capsule (100 mg total) by mouth 2 (two) times a day for 7 days, Disp: 14 capsule, Rfl: 0    escitalopram (LEXAPRO) 10 mg tablet, TAKE 1 TABLET BY MOUTH EVERY DAY WITH 5MG TABLET TOTAL DAILY DOSE 15MG, Disp: 90 tablet, Rfl: 0    escitalopram (LEXAPRO) 5 mg tablet, TAKE 1 TABLET BY MOUTH EVERY DAY WITH 10MG TABLET TOTAL DAILY DOSE 15MG, Disp: 90 tablet, Rfl: 0    Current Allergies     Allergies as of 12/26/2024 - Reviewed 12/26/2024   Allergen Reaction Noted    Pollen extract Allergic Rhinitis 05/26/2023            The following portions of the patient's history were reviewed and updated as appropriate: allergies, current medications, past family history, past medical history, past social history, past surgical history and problem list.     Past Medical History:   Diagnosis Date    Basal cell carcinoma (BCC) of scalp or skin of neck     Cancer (HCC)     DDD (degenerative disc disease), lumbar 7/30/2018    Left-sided thoracic back pain 5/25/2023       Past Surgical History:   Procedure Laterality Date    MALIGNANT SKIN LESION EXCISION  01/2014    Posterior neck    NO PAST SURGERIES      WISDOM TOOTH EXTRACTION         Family History   Problem Relation Age of Onset    Anxiety disorder Mother     Anxiety disorder Father     Depression Father     Coronary artery disease Father     Diabetes " Father     Depression Paternal Grandmother     Depression Half-Sister          Medications have been verified.        Objective   /84   Pulse 88   Temp 98.4 °F (36.9 °C)   Resp 18   Wt 96.6 kg (213 lb)   SpO2 97%   BMI 31.45 kg/m²   No LMP for male patient.       Physical Exam     Physical Exam  Vitals and nursing note reviewed.   Constitutional:       General: He is not in acute distress.     Appearance: Normal appearance. He is not ill-appearing or toxic-appearing.   HENT:      Head: Normocephalic and atraumatic.      Right Ear: External ear normal.      Left Ear: External ear normal.      Nose: Congestion present.      Mouth/Throat:      Mouth: Mucous membranes are moist.      Pharynx: Posterior oropharyngeal erythema present.      Comments: Cobblestoning and PND  Eyes:      Extraocular Movements: Extraocular movements intact.   Cardiovascular:      Rate and Rhythm: Normal rate and regular rhythm.      Heart sounds: Normal heart sounds.   Pulmonary:      Effort: Pulmonary effort is normal. No respiratory distress.      Breath sounds: Normal breath sounds. No stridor. No wheezing, rhonchi or rales.      Comments: Left upper with inspiratory wheeze, cleared after coughing  Skin:     General: Skin is warm and dry.      Capillary Refill: Capillary refill takes less than 2 seconds.      Findings: No rash.   Neurological:      Mental Status: He is alert.      Gait: Gait normal.   Psychiatric:         Behavior: Behavior normal.

## 2024-12-26 NOTE — PATIENT INSTRUCTIONS
Your rapid strep test was negative.  Radiologist will provide a final read of your chest x-ray, you can follow results your MyChart.  I am treating you with antibiotics which will cover sinus infection as well as chest infection.  I recommend rest, good hydration, follow-up with PCP next week if your symptoms are not adequately improving.  If you develop any severe worsening symptoms such as shortness of breath, please go to the ER.

## 2025-01-30 DIAGNOSIS — F33.1 MODERATE RECURRENT MAJOR DEPRESSION (HCC): ICD-10-CM

## 2025-01-30 DIAGNOSIS — F41.1 GENERALIZED ANXIETY DISORDER: ICD-10-CM

## 2025-01-30 RX ORDER — ESCITALOPRAM OXALATE 10 MG/1
10 TABLET ORAL DAILY
Qty: 90 TABLET | Refills: 1 | OUTPATIENT
Start: 2025-01-30

## 2025-01-30 NOTE — TELEPHONE ENCOUNTER
Reason for call:   [x] Refill   [] Prior Auth  [] Other:     Office:   [x] PCP/Provider -   [] Specialty/Provider - [facility]  Authorized By: Jennifer Davison PA-C    Medication:     escitalopram (LEXAPRO) 10 mg tablet         Pharmacy: [id number] - [place], [place] - [address] [phone]     Does the patient have enough for 3 days?   [] Yes   [x] No - Send as HP to POD

## 2025-02-03 DIAGNOSIS — F41.1 GENERALIZED ANXIETY DISORDER: ICD-10-CM

## 2025-02-03 DIAGNOSIS — F33.1 MODERATE RECURRENT MAJOR DEPRESSION (HCC): ICD-10-CM

## 2025-02-03 RX ORDER — ESCITALOPRAM OXALATE 5 MG/1
5 TABLET ORAL DAILY
Qty: 100 TABLET | Refills: 3 | Status: SHIPPED | OUTPATIENT
Start: 2025-02-03

## 2025-02-03 RX ORDER — ESCITALOPRAM OXALATE 10 MG/1
10 TABLET ORAL DAILY
Qty: 100 TABLET | Refills: 3 | Status: SHIPPED | OUTPATIENT
Start: 2025-02-03

## 2025-02-03 NOTE — TELEPHONE ENCOUNTER
Reason for call: Patient Stated that Jasper Valladares MD is taking over these refills  [x] Refill   [] Prior Auth  [] Other:     Office:   [x] PCP/Provider -   [] Specialty/Provider -     escitalopram (LEXAPRO) 5 mg tablet / TAKE 1 TABLET BY MOUTH EVERY DAY WITH 10MG TABLET TOTAL DAILY DOSE 15MG / Qty 90    escitalopram (LEXAPRO) 10 mg tablet/  TAKE 1 TABLET BY MOUTH EVERY DAY WITH 5MG TABLET TOTAL DAILY DOSE 15MG      Pharmacy: [id number] - [place], [place] - [address] [employer]     Does the patient have enough for 3 days?   [] Yes   [x] No - Send as HP to POD

## 2025-04-11 ENCOUNTER — TELEPHONE (OUTPATIENT)
Dept: INTERNAL MEDICINE CLINIC | Facility: CLINIC | Age: 45
End: 2025-04-11

## 2025-04-11 DIAGNOSIS — F33.1 MODERATE RECURRENT MAJOR DEPRESSION (HCC): ICD-10-CM

## 2025-04-11 DIAGNOSIS — F41.1 GENERALIZED ANXIETY DISORDER: ICD-10-CM

## 2025-04-11 RX ORDER — ESCITALOPRAM OXALATE 10 MG/1
15 TABLET ORAL DAILY
Qty: 135 TABLET | Refills: 0 | Status: SHIPPED | OUTPATIENT
Start: 2025-04-11

## 2025-04-11 NOTE — TELEPHONE ENCOUNTER
Patient called the RX Refill Line. Message is being forwarded to the office.     Patient is requesting to have a new script for the escitalopram, he stated that in Feb he was never given the 5 mg, he only got the 10 mg so he's been taking 1.5 daily but now he's out early and is being told it's too soon to refill it    Pt would like to have a new script for the 10 mg to take 1.5 daily (90 day supply) instead of trying to keep track of the two prescriptions  He's hoping this change is ok and that it can be made today as he'll run out over the weekend   The script can be sent to the [facility] in [place]     Please contact patient at

## 2025-06-30 DIAGNOSIS — F33.1 MODERATE RECURRENT MAJOR DEPRESSION (HCC): ICD-10-CM

## 2025-06-30 DIAGNOSIS — F41.1 GENERALIZED ANXIETY DISORDER: ICD-10-CM

## 2025-06-30 RX ORDER — ESCITALOPRAM OXALATE 10 MG/1
15 TABLET ORAL DAILY
Qty: 135 TABLET | Refills: 3 | Status: SHIPPED | OUTPATIENT
Start: 2025-06-30

## 2025-08-19 ENCOUNTER — OFFICE VISIT (OUTPATIENT)
Dept: INTERNAL MEDICINE CLINIC | Facility: CLINIC | Age: 45
End: 2025-08-19
Payer: COMMERCIAL

## 2025-08-19 VITALS
TEMPERATURE: 97.2 F | WEIGHT: 213.6 LBS | DIASTOLIC BLOOD PRESSURE: 82 MMHG | SYSTOLIC BLOOD PRESSURE: 130 MMHG | HEIGHT: 69 IN | BODY MASS INDEX: 31.64 KG/M2 | HEART RATE: 70 BPM

## 2025-08-19 DIAGNOSIS — Z12.5 SCREENING FOR PROSTATE CANCER: ICD-10-CM

## 2025-08-19 DIAGNOSIS — E78.00 PURE HYPERCHOLESTEROLEMIA: ICD-10-CM

## 2025-08-19 DIAGNOSIS — F41.1 GENERALIZED ANXIETY DISORDER: ICD-10-CM

## 2025-08-19 DIAGNOSIS — R73.01 IMPAIRED FASTING GLUCOSE: ICD-10-CM

## 2025-08-19 DIAGNOSIS — Z00.00 ENCOUNTER FOR WELLNESS EXAMINATION: Primary | ICD-10-CM

## 2025-08-19 PROCEDURE — 99396 PREV VISIT EST AGE 40-64: CPT | Performed by: INTERNAL MEDICINE
